# Patient Record
Sex: FEMALE | Race: WHITE | Employment: OTHER | URBAN - NONMETROPOLITAN AREA
[De-identification: names, ages, dates, MRNs, and addresses within clinical notes are randomized per-mention and may not be internally consistent; named-entity substitution may affect disease eponyms.]

---

## 2017-01-06 ENCOUNTER — OFFICE VISIT (OUTPATIENT)
Dept: PRIMARY CARE CLINIC | Age: 32
End: 2017-01-06
Payer: MEDICAID

## 2017-01-06 VITALS
SYSTOLIC BLOOD PRESSURE: 116 MMHG | OXYGEN SATURATION: 98 % | HEART RATE: 108 BPM | HEIGHT: 63 IN | WEIGHT: 221 LBS | DIASTOLIC BLOOD PRESSURE: 79 MMHG | RESPIRATION RATE: 19 BRPM | TEMPERATURE: 97.6 F | BODY MASS INDEX: 39.16 KG/M2

## 2017-01-06 DIAGNOSIS — M54.50 BACK PAIN AT L4-L5 LEVEL: Primary | ICD-10-CM

## 2017-01-06 DIAGNOSIS — N32.81 OVERACTIVE BLADDER: ICD-10-CM

## 2017-01-06 LAB
AMPHETAMINE SCREEN, URINE: ABNORMAL
BARBITURATE SCREEN, URINE: ABNORMAL
BENZODIAZEPINE SCREEN, URINE: ABNORMAL
BILIRUBIN, POC: NORMAL
BLOOD URINE, POC: NORMAL
CLARITY, POC: CLEAR
COCAINE METABOLITE SCREEN URINE: ABNORMAL
COLOR, POC: YELLOW
GLUCOSE URINE, POC: NORMAL
KETONES, POC: NORMAL
LEUKOCYTE EST, POC: NORMAL
MDMA URINE: ABNORMAL
METHADONE SCREEN, URINE: ABNORMAL
METHAMPHETAMINE, URINE: ABNORMAL
NITRITE, POC: NORMAL
OPIATE SCREEN URINE: ABNORMAL
OXYCODONE SCREEN URINE: ABNORMAL
PH, POC: 5.5
PHENCYCLIDINE SCREEN URINE: ABNORMAL
PROPOXYPHENE SCREEN, URINE: ABNORMAL
PROTEIN, POC: NORMAL
SPECIFIC GRAVITY, POC: 1.03
THC: ABNORMAL
TRICYCLIC ANTIDEPRESSANTS, UR: ABNORMAL
UROBILINOGEN, POC: 0.2

## 2017-01-06 PROCEDURE — 81002 URINALYSIS NONAUTO W/O SCOPE: CPT | Performed by: NURSE PRACTITIONER

## 2017-01-06 PROCEDURE — 96372 THER/PROPH/DIAG INJ SC/IM: CPT | Performed by: NURSE PRACTITIONER

## 2017-01-06 PROCEDURE — 99214 OFFICE O/P EST MOD 30 MIN: CPT | Performed by: NURSE PRACTITIONER

## 2017-01-06 PROCEDURE — 80305 DRUG TEST PRSMV DIR OPT OBS: CPT | Performed by: NURSE PRACTITIONER

## 2017-01-06 RX ORDER — BACLOFEN 10 MG/1
10 TABLET ORAL 3 TIMES DAILY
Qty: 90 TABLET | Refills: 5 | Status: SHIPPED | OUTPATIENT
Start: 2017-01-06 | End: 2020-08-17 | Stop reason: ALTCHOICE

## 2017-01-06 RX ORDER — OXYBUTYNIN CHLORIDE 10 MG/1
10 TABLET, EXTENDED RELEASE ORAL DAILY
Qty: 30 TABLET | Refills: 3 | Status: SHIPPED | OUTPATIENT
Start: 2017-01-06

## 2017-01-06 RX ORDER — BETAMETHASONE SODIUM PHOSPHATE AND BETAMETHASONE ACETATE 3; 3 MG/ML; MG/ML
12 INJECTION, SUSPENSION INTRA-ARTICULAR; INTRALESIONAL; INTRAMUSCULAR; SOFT TISSUE ONCE
Status: COMPLETED | OUTPATIENT
Start: 2017-01-06 | End: 2017-01-06

## 2017-01-06 RX ORDER — TRAMADOL HYDROCHLORIDE 50 MG/1
50 TABLET ORAL EVERY 8 HOURS PRN
Qty: 30 TABLET | Refills: 0 | Status: SHIPPED | OUTPATIENT
Start: 2017-01-06 | End: 2017-01-30 | Stop reason: SDUPTHER

## 2017-01-06 RX ADMIN — BETAMETHASONE SODIUM PHOSPHATE AND BETAMETHASONE ACETATE 12 MG: 3; 3 INJECTION, SUSPENSION INTRA-ARTICULAR; INTRALESIONAL; INTRAMUSCULAR; SOFT TISSUE at 14:24

## 2017-01-11 ENCOUNTER — OFFICE VISIT (OUTPATIENT)
Dept: URGENT CARE | Age: 32
End: 2017-01-11
Payer: MEDICAID

## 2017-01-11 VITALS
BODY MASS INDEX: 39.15 KG/M2 | TEMPERATURE: 97.9 F | HEART RATE: 68 BPM | DIASTOLIC BLOOD PRESSURE: 70 MMHG | SYSTOLIC BLOOD PRESSURE: 120 MMHG | WEIGHT: 221 LBS | RESPIRATION RATE: 18 BRPM | OXYGEN SATURATION: 99 %

## 2017-01-11 DIAGNOSIS — J02.9 SORE THROAT: ICD-10-CM

## 2017-01-11 DIAGNOSIS — J02.9 PHARYNGITIS, UNSPECIFIED ETIOLOGY: Primary | ICD-10-CM

## 2017-01-11 LAB — S PYO AG THROAT QL: NORMAL

## 2017-01-11 PROCEDURE — 87880 STREP A ASSAY W/OPTIC: CPT | Performed by: NURSE PRACTITIONER

## 2017-01-11 PROCEDURE — 99213 OFFICE O/P EST LOW 20 MIN: CPT | Performed by: NURSE PRACTITIONER

## 2017-01-11 ASSESSMENT — ENCOUNTER SYMPTOMS
RHINORRHEA: 1
GASTROINTESTINAL NEGATIVE: 1
SINUS PRESSURE: 0
COUGH: 0
SORE THROAT: 1

## 2017-01-12 ENCOUNTER — TELEPHONE (OUTPATIENT)
Dept: URGENT CARE | Age: 32
End: 2017-01-12

## 2017-01-12 DIAGNOSIS — J02.8 PHARYNGITIS DUE TO OTHER ORGANISM: Primary | ICD-10-CM

## 2017-01-12 RX ORDER — AMOXICILLIN AND CLAVULANATE POTASSIUM 875; 125 MG/1; MG/1
1 TABLET, FILM COATED ORAL EVERY 12 HOURS
Qty: 20 TABLET | Refills: 0 | Status: SHIPPED | OUTPATIENT
Start: 2017-01-12 | End: 2017-01-22

## 2017-01-14 PROBLEM — N32.81 OVERACTIVE BLADDER: Status: ACTIVE | Noted: 2017-01-14

## 2017-01-14 PROBLEM — M54.50 BACK PAIN AT L4-L5 LEVEL: Status: ACTIVE | Noted: 2017-01-14

## 2017-01-14 ASSESSMENT — ENCOUNTER SYMPTOMS
BACK PAIN: 1
DIARRHEA: 0
CONSTIPATION: 0
SHORTNESS OF BREATH: 0

## 2017-01-19 ENCOUNTER — OFFICE VISIT (OUTPATIENT)
Dept: PRIMARY CARE CLINIC | Age: 32
End: 2017-01-19
Payer: MEDICAID

## 2017-01-19 VITALS
SYSTOLIC BLOOD PRESSURE: 120 MMHG | RESPIRATION RATE: 16 BRPM | DIASTOLIC BLOOD PRESSURE: 60 MMHG | HEART RATE: 98 BPM | TEMPERATURE: 98.4 F | BODY MASS INDEX: 38.09 KG/M2 | OXYGEN SATURATION: 99 % | WEIGHT: 215 LBS | HEIGHT: 63 IN

## 2017-01-19 DIAGNOSIS — J01.00 ACUTE NON-RECURRENT MAXILLARY SINUSITIS: Primary | ICD-10-CM

## 2017-01-19 PROCEDURE — 99213 OFFICE O/P EST LOW 20 MIN: CPT | Performed by: FAMILY MEDICINE

## 2017-01-19 RX ORDER — DOXYCYCLINE HYCLATE 100 MG
100 TABLET ORAL 2 TIMES DAILY
Qty: 20 TABLET | Refills: 0 | Status: SHIPPED | OUTPATIENT
Start: 2017-01-19 | End: 2017-01-29

## 2017-01-19 RX ORDER — AZELASTINE 1 MG/ML
2 SPRAY, METERED NASAL 2 TIMES DAILY
Qty: 1 BOTTLE | Refills: 3 | Status: SHIPPED | OUTPATIENT
Start: 2017-01-19 | End: 2020-08-17 | Stop reason: ALTCHOICE

## 2017-01-19 RX ORDER — DEXTROMETHORPHAN HYDROBROMIDE AND PROMETHAZINE HYDROCHLORIDE 15; 6.25 MG/5ML; MG/5ML
5 SYRUP ORAL 4 TIMES DAILY PRN
Qty: 180 ML | Refills: 0 | Status: SHIPPED | OUTPATIENT
Start: 2017-01-19 | End: 2017-01-26

## 2017-01-19 ASSESSMENT — ENCOUNTER SYMPTOMS
COUGH: 1
SHORTNESS OF BREATH: 0
RHINORRHEA: 1
SORE THROAT: 1
SINUS PRESSURE: 1

## 2017-01-27 RX ORDER — TRAMADOL HYDROCHLORIDE 50 MG/1
TABLET ORAL
Qty: 30 TABLET | Refills: 0 | OUTPATIENT
Start: 2017-01-27

## 2017-01-30 DIAGNOSIS — M54.50 BACK PAIN AT L4-L5 LEVEL: ICD-10-CM

## 2017-01-30 RX ORDER — TRAMADOL HYDROCHLORIDE 50 MG/1
50 TABLET ORAL EVERY 8 HOURS PRN
Qty: 30 TABLET | Refills: 0 | OUTPATIENT
Start: 2017-01-30 | End: 2017-02-09

## 2017-07-14 ENCOUNTER — OFFICE VISIT (OUTPATIENT)
Dept: URGENT CARE | Age: 32
End: 2017-07-14
Payer: MEDICAID

## 2017-07-14 VITALS
WEIGHT: 222 LBS | HEART RATE: 110 BPM | TEMPERATURE: 98.2 F | OXYGEN SATURATION: 98 % | HEIGHT: 64 IN | RESPIRATION RATE: 18 BRPM | BODY MASS INDEX: 37.9 KG/M2 | SYSTOLIC BLOOD PRESSURE: 130 MMHG | DIASTOLIC BLOOD PRESSURE: 79 MMHG

## 2017-07-14 DIAGNOSIS — M51.36 DEGENERATIVE DISC DISEASE, LUMBAR: Primary | ICD-10-CM

## 2017-07-14 DIAGNOSIS — G89.29 CHRONIC RIGHT-SIDED LOW BACK PAIN WITH LEFT-SIDED SCIATICA: ICD-10-CM

## 2017-07-14 DIAGNOSIS — M54.42 CHRONIC RIGHT-SIDED LOW BACK PAIN WITH LEFT-SIDED SCIATICA: ICD-10-CM

## 2017-07-14 PROCEDURE — 99213 OFFICE O/P EST LOW 20 MIN: CPT | Performed by: NURSE PRACTITIONER

## 2017-07-14 RX ORDER — IBUPROFEN 200 MG
200 TABLET ORAL EVERY 6 HOURS PRN
COMMUNITY
End: 2017-08-15

## 2017-07-14 RX ORDER — IBUPROFEN 800 MG/1
TABLET ORAL
Qty: 120 TABLET | Refills: 0 | Status: SHIPPED | OUTPATIENT
Start: 2017-07-14 | End: 2020-08-17 | Stop reason: ALTCHOICE

## 2017-07-14 ASSESSMENT — ENCOUNTER SYMPTOMS
RESPIRATORY NEGATIVE: 1
BACK PAIN: 1

## 2020-03-31 ENCOUNTER — TELEPHONE (OUTPATIENT)
Dept: OBSTETRICS AND GYNECOLOGY | Facility: CLINIC | Age: 35
End: 2020-03-31

## 2020-04-08 ENCOUNTER — HOSPITAL ENCOUNTER (EMERGENCY)
Facility: HOSPITAL | Age: 35
Discharge: HOME | End: 2020-04-08
Attending: INTERNAL MEDICINE | Admitting: INTERNAL MEDICINE

## 2020-04-08 VITALS
WEIGHT: 210 LBS | BODY MASS INDEX: 37.21 KG/M2 | HEART RATE: 82 BPM | DIASTOLIC BLOOD PRESSURE: 91 MMHG | OXYGEN SATURATION: 100 % | HEIGHT: 63 IN | TEMPERATURE: 98.8 F | RESPIRATION RATE: 16 BRPM | SYSTOLIC BLOOD PRESSURE: 128 MMHG

## 2020-04-08 DIAGNOSIS — J02.8 PHARYNGITIS DUE TO OTHER ORGANISM: Primary | ICD-10-CM

## 2020-04-08 LAB — S PYO AG THROAT QL: NEGATIVE

## 2020-04-08 PROCEDURE — 87880 STREP A ASSAY W/OPTIC: CPT | Performed by: INTERNAL MEDICINE

## 2020-04-08 PROCEDURE — 99283 EMERGENCY DEPT VISIT LOW MDM: CPT

## 2020-04-08 PROCEDURE — 87081 CULTURE SCREEN ONLY: CPT | Performed by: INTERNAL MEDICINE

## 2020-04-08 RX ORDER — AMOXICILLIN 250 MG/1
250 CAPSULE ORAL 3 TIMES DAILY
Qty: 21 CAPSULE | Refills: 0 | Status: SHIPPED | OUTPATIENT
Start: 2020-04-08 | End: 2020-06-04

## 2020-04-10 LAB — BACTERIA SPEC AEROBE CULT: NORMAL

## 2020-06-04 ENCOUNTER — OFFICE VISIT (OUTPATIENT)
Dept: OBSTETRICS AND GYNECOLOGY | Facility: CLINIC | Age: 35
End: 2020-06-04

## 2020-06-04 VITALS
HEIGHT: 63 IN | WEIGHT: 210 LBS | BODY MASS INDEX: 37.21 KG/M2 | DIASTOLIC BLOOD PRESSURE: 76 MMHG | SYSTOLIC BLOOD PRESSURE: 118 MMHG

## 2020-06-04 DIAGNOSIS — Z76.89 ESTABLISHING CARE WITH NEW DOCTOR, ENCOUNTER FOR: ICD-10-CM

## 2020-06-04 DIAGNOSIS — R10.2 PELVIC PAIN: Primary | ICD-10-CM

## 2020-06-04 PROCEDURE — 99203 OFFICE O/P NEW LOW 30 MIN: CPT | Performed by: NURSE PRACTITIONER

## 2020-06-22 ENCOUNTER — OFFICE VISIT (OUTPATIENT)
Dept: INTERNAL MEDICINE | Facility: CLINIC | Age: 35
End: 2020-06-22

## 2020-06-22 ENCOUNTER — LAB (OUTPATIENT)
Dept: LAB | Facility: HOSPITAL | Age: 35
End: 2020-06-22

## 2020-06-22 VITALS
BODY MASS INDEX: 37.12 KG/M2 | TEMPERATURE: 98.5 F | OXYGEN SATURATION: 98 % | SYSTOLIC BLOOD PRESSURE: 110 MMHG | HEART RATE: 110 BPM | DIASTOLIC BLOOD PRESSURE: 72 MMHG | HEIGHT: 63 IN | RESPIRATION RATE: 16 BRPM | WEIGHT: 209.5 LBS

## 2020-06-22 DIAGNOSIS — F41.9 ANXIETY AND DEPRESSION: ICD-10-CM

## 2020-06-22 DIAGNOSIS — N39.3 STRESS INCONTINENCE IN FEMALE: ICD-10-CM

## 2020-06-22 DIAGNOSIS — R25.2 MUSCLE CRAMPS: Primary | ICD-10-CM

## 2020-06-22 DIAGNOSIS — F17.210 CIGARETTE SMOKER: ICD-10-CM

## 2020-06-22 DIAGNOSIS — E66.09 CLASS 2 OBESITY DUE TO EXCESS CALORIES WITHOUT SERIOUS COMORBIDITY WITH BODY MASS INDEX (BMI) OF 37.0 TO 37.9 IN ADULT: ICD-10-CM

## 2020-06-22 DIAGNOSIS — Z00.00 ENCOUNTER FOR PREVENTIVE HEALTH EXAMINATION: ICD-10-CM

## 2020-06-22 DIAGNOSIS — F32.A ANXIETY AND DEPRESSION: ICD-10-CM

## 2020-06-22 DIAGNOSIS — Z80.41 FAMILY HISTORY OF OVARIAN CANCER: ICD-10-CM

## 2020-06-22 DIAGNOSIS — R25.2 MUSCLE CRAMPS: ICD-10-CM

## 2020-06-22 PROBLEM — E66.812 CLASS 2 OBESITY DUE TO EXCESS CALORIES WITHOUT SERIOUS COMORBIDITY WITH BODY MASS INDEX (BMI) OF 37.0 TO 37.9 IN ADULT: Status: ACTIVE | Noted: 2020-06-22

## 2020-06-22 LAB
ALBUMIN SERPL-MCNC: 4.4 G/DL (ref 3.5–5.2)
ALBUMIN/GLOB SERPL: 1.4 G/DL
ALP SERPL-CCNC: 59 U/L (ref 39–117)
ALT SERPL W P-5'-P-CCNC: 23 U/L (ref 1–33)
ANION GAP SERPL CALCULATED.3IONS-SCNC: 13 MMOL/L (ref 5–15)
AST SERPL-CCNC: 15 U/L (ref 1–32)
BILIRUB SERPL-MCNC: 0.4 MG/DL (ref 0.2–1.2)
BUN BLD-MCNC: 9 MG/DL (ref 6–20)
BUN/CREAT SERPL: 12.9 (ref 7–25)
CALCIUM SPEC-SCNC: 9.3 MG/DL (ref 8.6–10.5)
CHLORIDE SERPL-SCNC: 100 MMOL/L (ref 98–107)
CO2 SERPL-SCNC: 26 MMOL/L (ref 22–29)
CREAT BLD-MCNC: 0.7 MG/DL (ref 0.57–1)
DEPRECATED RDW RBC AUTO: 44 FL (ref 37–54)
ERYTHROCYTE [DISTWIDTH] IN BLOOD BY AUTOMATED COUNT: 12.9 % (ref 12.3–15.4)
GFR SERPL CREATININE-BSD FRML MDRD: 96 ML/MIN/1.73
GLOBULIN UR ELPH-MCNC: 3.2 GM/DL
GLUCOSE BLD-MCNC: 88 MG/DL (ref 65–99)
HCT VFR BLD AUTO: 43.4 % (ref 34–46.6)
HGB BLD-MCNC: 14.7 G/DL (ref 12–15.9)
MCH RBC QN AUTO: 31.6 PG (ref 26.6–33)
MCHC RBC AUTO-ENTMCNC: 33.9 G/DL (ref 31.5–35.7)
MCV RBC AUTO: 93.3 FL (ref 79–97)
PLATELET # BLD AUTO: 315 10*3/MM3 (ref 140–450)
PMV BLD AUTO: 11 FL (ref 6–12)
POTASSIUM BLD-SCNC: 4.3 MMOL/L (ref 3.5–5.2)
PROT SERPL-MCNC: 7.6 G/DL (ref 6–8.5)
RBC # BLD AUTO: 4.65 10*6/MM3 (ref 3.77–5.28)
SODIUM BLD-SCNC: 139 MMOL/L (ref 136–145)
WBC NRBC COR # BLD: 11.31 10*3/MM3 (ref 3.4–10.8)

## 2020-06-22 PROCEDURE — 36415 COLL VENOUS BLD VENIPUNCTURE: CPT

## 2020-06-22 PROCEDURE — 82550 ASSAY OF CK (CPK): CPT | Performed by: INTERNAL MEDICINE

## 2020-06-22 PROCEDURE — 80061 LIPID PANEL: CPT | Performed by: INTERNAL MEDICINE

## 2020-06-22 PROCEDURE — 80053 COMPREHEN METABOLIC PANEL: CPT | Performed by: INTERNAL MEDICINE

## 2020-06-22 PROCEDURE — 99204 OFFICE O/P NEW MOD 45 MIN: CPT | Performed by: INTERNAL MEDICINE

## 2020-06-22 PROCEDURE — 86803 HEPATITIS C AB TEST: CPT | Performed by: INTERNAL MEDICINE

## 2020-06-22 PROCEDURE — 83036 HEMOGLOBIN GLYCOSYLATED A1C: CPT | Performed by: INTERNAL MEDICINE

## 2020-06-22 PROCEDURE — 85027 COMPLETE CBC AUTOMATED: CPT | Performed by: INTERNAL MEDICINE

## 2020-06-23 LAB
CHOLEST SERPL-MCNC: 182 MG/DL (ref 0–200)
CK SERPL-CCNC: 58 U/L (ref 20–180)
HBA1C MFR BLD: 5.5 % (ref 4.8–5.6)
HCV AB SER DONR QL: NORMAL
HDLC SERPL-MCNC: 36 MG/DL (ref 40–60)
LDLC SERPL CALC-MCNC: 128 MG/DL (ref 0–100)
LDLC/HDLC SERPL: 3.55 {RATIO}
TRIGL SERPL-MCNC: 91 MG/DL (ref 0–150)
VLDLC SERPL-MCNC: 18.2 MG/DL (ref 5–40)

## 2020-07-06 ENCOUNTER — OFFICE VISIT (OUTPATIENT)
Dept: OBSTETRICS AND GYNECOLOGY | Facility: CLINIC | Age: 35
End: 2020-07-06

## 2020-07-06 VITALS
BODY MASS INDEX: 36.5 KG/M2 | DIASTOLIC BLOOD PRESSURE: 70 MMHG | HEIGHT: 63 IN | SYSTOLIC BLOOD PRESSURE: 118 MMHG | WEIGHT: 206 LBS

## 2020-07-06 DIAGNOSIS — R10.2 PELVIC PAIN: Primary | ICD-10-CM

## 2020-07-06 DIAGNOSIS — N39.46 MIXED INCONTINENCE: ICD-10-CM

## 2020-07-06 PROCEDURE — 99213 OFFICE O/P EST LOW 20 MIN: CPT | Performed by: NURSE PRACTITIONER

## 2020-07-15 ENCOUNTER — TELEPHONE (OUTPATIENT)
Dept: OBSTETRICS AND GYNECOLOGY | Facility: CLINIC | Age: 35
End: 2020-07-15

## 2020-07-17 ENCOUNTER — PATIENT MESSAGE (OUTPATIENT)
Dept: OBSTETRICS AND GYNECOLOGY | Facility: CLINIC | Age: 35
End: 2020-07-17

## 2020-07-22 ENCOUNTER — TELEPHONE (OUTPATIENT)
Dept: OBSTETRICS AND GYNECOLOGY | Facility: CLINIC | Age: 35
End: 2020-07-22

## 2020-07-22 DIAGNOSIS — N39.46 MIXED INCONTINENCE: Primary | ICD-10-CM

## 2020-07-23 ENCOUNTER — TELEPHONE (OUTPATIENT)
Dept: OBSTETRICS AND GYNECOLOGY | Facility: CLINIC | Age: 35
End: 2020-07-23

## 2020-07-29 RX ORDER — OXYBUTYNIN CHLORIDE 10 MG/1
10 TABLET, EXTENDED RELEASE ORAL DAILY
Qty: 30 TABLET | Refills: 12 | Status: SHIPPED | OUTPATIENT
Start: 2020-07-29 | End: 2021-08-20

## 2020-08-17 ENCOUNTER — HOSPITAL ENCOUNTER (INPATIENT)
Age: 35
LOS: 2 days | Discharge: HOME | DRG: 885 | End: 2020-08-19
Attending: PEDIATRICS | Admitting: PSYCHIATRY & NEUROLOGY
Payer: MEDICAID

## 2020-08-17 PROBLEM — F25.0 SCHIZOAFFECTIVE DISORDER, BIPOLAR TYPE (HCC): Status: ACTIVE | Noted: 2020-08-17

## 2020-08-17 LAB
ACETAMINOPHEN LEVEL: <15 UG/ML
ALBUMIN SERPL-MCNC: 4.7 G/DL (ref 3.5–5.2)
ALP BLD-CCNC: 65 U/L (ref 35–104)
ALT SERPL-CCNC: 31 U/L (ref 5–33)
AMPHETAMINE SCREEN, URINE: NEGATIVE
ANION GAP SERPL CALCULATED.3IONS-SCNC: 19 MMOL/L (ref 7–19)
AST SERPL-CCNC: 25 U/L (ref 5–32)
BACTERIA: NEGATIVE /HPF
BARBITURATE SCREEN URINE: NEGATIVE
BASOPHILS ABSOLUTE: 0.1 K/UL (ref 0–0.2)
BASOPHILS RELATIVE PERCENT: 0.5 % (ref 0–1)
BENZODIAZEPINE SCREEN, URINE: NEGATIVE
BILIRUB SERPL-MCNC: 0.7 MG/DL (ref 0.2–1.2)
BILIRUBIN URINE: NEGATIVE
BLOOD, URINE: ABNORMAL
BUN BLDV-MCNC: 9 MG/DL (ref 6–20)
CALCIUM SERPL-MCNC: 9.4 MG/DL (ref 8.6–10)
CANNABINOID SCREEN URINE: POSITIVE
CHLORIDE BLD-SCNC: 102 MMOL/L (ref 98–111)
CLARITY: ABNORMAL
CO2: 17 MMOL/L (ref 22–29)
COCAINE METABOLITE SCREEN URINE: NEGATIVE
COLOR: YELLOW
CREAT SERPL-MCNC: 0.8 MG/DL (ref 0.5–0.9)
CRYSTALS, UA: ABNORMAL /HPF
EOSINOPHILS ABSOLUTE: 0 K/UL (ref 0–0.6)
EOSINOPHILS RELATIVE PERCENT: 0.1 % (ref 0–5)
EPITHELIAL CELLS, UA: 6 /HPF (ref 0–5)
ETHANOL: <10 MG/DL (ref 0–0.08)
GFR AFRICAN AMERICAN: >59
GFR NON-AFRICAN AMERICAN: >60
GLUCOSE BLD-MCNC: 146 MG/DL (ref 74–109)
GLUCOSE URINE: NEGATIVE MG/DL
HCG QUALITATIVE: NEGATIVE
HCT VFR BLD CALC: 46.1 % (ref 37–47)
HEMOGLOBIN: 15.7 G/DL (ref 12–16)
HYALINE CASTS: 6 /HPF (ref 0–8)
IMMATURE GRANULOCYTES #: 0.1 K/UL
KETONES, URINE: 15 MG/DL
LEUKOCYTE ESTERASE, URINE: NEGATIVE
LYMPHOCYTES ABSOLUTE: 2.5 K/UL (ref 1.1–4.5)
LYMPHOCYTES RELATIVE PERCENT: 14.2 % (ref 20–40)
Lab: ABNORMAL
MCH RBC QN AUTO: 31.9 PG (ref 27–31)
MCHC RBC AUTO-ENTMCNC: 34.1 G/DL (ref 33–37)
MCV RBC AUTO: 93.7 FL (ref 81–99)
MONOCYTES ABSOLUTE: 1.5 K/UL (ref 0–0.9)
MONOCYTES RELATIVE PERCENT: 8.2 % (ref 0–10)
NEUTROPHILS ABSOLUTE: 13.6 K/UL (ref 1.5–7.5)
NEUTROPHILS RELATIVE PERCENT: 76.7 % (ref 50–65)
NITRITE, URINE: NEGATIVE
OPIATE SCREEN URINE: NEGATIVE
PDW BLD-RTO: 12.9 % (ref 11.5–14.5)
PH UA: 5.5 (ref 5–8)
PLATELET # BLD: 342 K/UL (ref 130–400)
PMV BLD AUTO: 11.3 FL (ref 9.4–12.3)
POTASSIUM SERPL-SCNC: 3.6 MMOL/L (ref 3.5–5)
PROTEIN UA: ABNORMAL MG/DL
RBC # BLD: 4.92 M/UL (ref 4.2–5.4)
RBC UA: 3 /HPF (ref 0–4)
SALICYLATE, SERUM: <3 MG/DL (ref 3–10)
SODIUM BLD-SCNC: 138 MMOL/L (ref 136–145)
SPECIFIC GRAVITY UA: 1.01 (ref 1–1.03)
TOTAL PROTEIN: 8.2 G/DL (ref 6.6–8.7)
UROBILINOGEN, URINE: 0.2 E.U./DL
WBC # BLD: 17.8 K/UL (ref 4.8–10.8)
WBC UA: 5 /HPF (ref 0–5)

## 2020-08-17 PROCEDURE — 1240000000 HC EMOTIONAL WELLNESS R&B

## 2020-08-17 PROCEDURE — 6360000002 HC RX W HCPCS: Performed by: PSYCHIATRY & NEUROLOGY

## 2020-08-17 PROCEDURE — 6370000000 HC RX 637 (ALT 250 FOR IP): Performed by: PSYCHIATRY & NEUROLOGY

## 2020-08-17 PROCEDURE — 84703 CHORIONIC GONADOTROPIN ASSAY: CPT

## 2020-08-17 PROCEDURE — 80307 DRUG TEST PRSMV CHEM ANLYZR: CPT

## 2020-08-17 PROCEDURE — G0480 DRUG TEST DEF 1-7 CLASSES: HCPCS

## 2020-08-17 PROCEDURE — 85025 COMPLETE CBC W/AUTO DIFF WBC: CPT

## 2020-08-17 PROCEDURE — 36415 COLL VENOUS BLD VENIPUNCTURE: CPT

## 2020-08-17 PROCEDURE — 99284 EMERGENCY DEPT VISIT MOD MDM: CPT

## 2020-08-17 PROCEDURE — 80053 COMPREHEN METABOLIC PANEL: CPT

## 2020-08-17 PROCEDURE — 81001 URINALYSIS AUTO W/SCOPE: CPT

## 2020-08-17 PROCEDURE — 96372 THER/PROPH/DIAG INJ SC/IM: CPT

## 2020-08-17 PROCEDURE — 6360000002 HC RX W HCPCS: Performed by: PEDIATRICS

## 2020-08-17 PROCEDURE — 99223 1ST HOSP IP/OBS HIGH 75: CPT | Performed by: PSYCHIATRY & NEUROLOGY

## 2020-08-17 RX ORDER — CHLORPROMAZINE HYDROCHLORIDE 25 MG/ML
50 INJECTION INTRAMUSCULAR ONCE
Status: COMPLETED | OUTPATIENT
Start: 2020-08-17 | End: 2020-08-17

## 2020-08-17 RX ORDER — HALOPERIDOL 5 MG/ML
5 INJECTION INTRAMUSCULAR EVERY 6 HOURS PRN
Status: DISCONTINUED | OUTPATIENT
Start: 2020-08-17 | End: 2020-08-17

## 2020-08-17 RX ORDER — RISPERIDONE 1 MG/1
1 TABLET, FILM COATED ORAL 2 TIMES DAILY
Status: DISCONTINUED | OUTPATIENT
Start: 2020-08-17 | End: 2020-08-19 | Stop reason: HOSPADM

## 2020-08-17 RX ORDER — HALOPERIDOL 5 MG/ML
5 INJECTION INTRAMUSCULAR ONCE
Status: COMPLETED | OUTPATIENT
Start: 2020-08-17 | End: 2020-08-17

## 2020-08-17 RX ORDER — OXYBUTYNIN CHLORIDE 5 MG/1
10 TABLET, EXTENDED RELEASE ORAL DAILY
Status: DISCONTINUED | OUTPATIENT
Start: 2020-08-17 | End: 2020-08-19 | Stop reason: HOSPADM

## 2020-08-17 RX ORDER — LORAZEPAM 2 MG/ML
2 INJECTION INTRAMUSCULAR EVERY 6 HOURS PRN
Status: DISCONTINUED | OUTPATIENT
Start: 2020-08-17 | End: 2020-08-19 | Stop reason: HOSPADM

## 2020-08-17 RX ORDER — TRAZODONE HYDROCHLORIDE 150 MG/1
150 TABLET ORAL PRN
COMMUNITY

## 2020-08-17 RX ORDER — NICOTINE 21 MG/24HR
1 PATCH, TRANSDERMAL 24 HOURS TRANSDERMAL DAILY
Status: DISCONTINUED | OUTPATIENT
Start: 2020-08-17 | End: 2020-08-19 | Stop reason: HOSPADM

## 2020-08-17 RX ORDER — LORAZEPAM 2 MG/ML
2 INJECTION INTRAMUSCULAR ONCE
Status: COMPLETED | OUTPATIENT
Start: 2020-08-17 | End: 2020-08-17

## 2020-08-17 RX ORDER — DIVALPROEX SODIUM 500 MG/1
1000 TABLET, EXTENDED RELEASE ORAL DAILY
Status: DISCONTINUED | OUTPATIENT
Start: 2020-08-17 | End: 2020-08-19 | Stop reason: HOSPADM

## 2020-08-17 RX ORDER — POLYETHYLENE GLYCOL 3350 17 G/17G
17 POWDER, FOR SOLUTION ORAL DAILY PRN
Status: DISCONTINUED | OUTPATIENT
Start: 2020-08-17 | End: 2020-08-19 | Stop reason: HOSPADM

## 2020-08-17 RX ORDER — HALOPERIDOL 5 MG/ML
10 INJECTION INTRAMUSCULAR EVERY 6 HOURS PRN
Status: DISCONTINUED | OUTPATIENT
Start: 2020-08-17 | End: 2020-08-19 | Stop reason: HOSPADM

## 2020-08-17 RX ORDER — LORAZEPAM 2 MG/ML
2 INJECTION INTRAMUSCULAR EVERY 6 HOURS PRN
Status: DISCONTINUED | OUTPATIENT
Start: 2020-08-17 | End: 2020-08-17

## 2020-08-17 RX ORDER — ACETAMINOPHEN 325 MG/1
650 TABLET ORAL EVERY 4 HOURS PRN
Status: DISCONTINUED | OUTPATIENT
Start: 2020-08-17 | End: 2020-08-19 | Stop reason: HOSPADM

## 2020-08-17 RX ADMIN — LORAZEPAM 2 MG: 2 INJECTION INTRAMUSCULAR; INTRAVENOUS at 17:34

## 2020-08-17 RX ADMIN — LORAZEPAM 2 MG: 2 INJECTION INTRAMUSCULAR; INTRAVENOUS at 03:04

## 2020-08-17 RX ADMIN — OXYBUTYNIN CHLORIDE 10 MG: 5 TABLET, EXTENDED RELEASE ORAL at 12:10

## 2020-08-17 RX ADMIN — DIVALPROEX SODIUM 1000 MG: 500 TABLET, EXTENDED RELEASE ORAL at 12:10

## 2020-08-17 RX ADMIN — HALOPERIDOL LACTATE 5 MG: 5 INJECTION INTRAMUSCULAR at 17:34

## 2020-08-17 RX ADMIN — ACETAMINOPHEN 650 MG: 325 TABLET ORAL at 04:29

## 2020-08-17 RX ADMIN — RISPERIDONE 1 MG: 1 TABLET ORAL at 12:10

## 2020-08-17 RX ADMIN — CHLORPROMAZINE HYDROCHLORIDE 50 MG: 25 INJECTION INTRAMUSCULAR at 04:01

## 2020-08-17 RX ADMIN — HALOPERIDOL LACTATE 5 MG: 5 INJECTION INTRAMUSCULAR at 03:08

## 2020-08-17 NOTE — PROGRESS NOTES
I Admission from ED  Nursing Admission Note             Patient Active Problem List   Diagnosis    Back pain at L4-L5 level    Overactive bladder         Addictive Behavior:   Addictive Behavior  In the past 3 months, have you felt or has someone told you that you have a problem with:  : (Unable to assess this section)    Medical Problems:   Past Medical History:   Diagnosis Date    Depression     H/O borderline personality disorder     History of cervical cancer     Sciatica        Status EXAM:  Status and Exam  Normal: No  Facial Expression: Exaggerated, Elevated  Affect: Unstable  Level of Consciousness: Alert  Mood:Normal: No  Mood: Anxious  Motor Activity:Normal: No  Motor Activity: Tics, Repetitive Acts  Interview Behavior: Impulsive, Uncooperative/Withdrawn  Preception: [place] to Person  Attention:Normal: No  Attention: Distractible, Unable to Concentrate, Hyperalert  Thought Processes: Blocking, Flt.of Ideas  Thought Content:Normal: No  Thought Content: Preoccupations  Hallucinations: Unable to assess  Delusions: No  Memory:Normal: Yes  Insight and Judgment: No  Insight and Judgment: Poor Judgment, Poor Insight  Present Suicidal Ideation: No  Present Homicidal Ideation: No      Metabolic Screening:    No results found for: LABA1C  Lab Results   Component Value Date    CHOL 166 11/11/2014     Lab Results   Component Value Date    TRIG 94 11/11/2014     Lab Results   Component Value Date    HDL 37 11/11/2014     No components found for: LDLCAL  No results found for: LABVLDL    Body mass index is 30.85 kg/m².   BP Readings from Last 2 Encounters:   08/17/20 (!) 134/97   08/15/17 116/78       PATIENT STRENGTHS:  Strengths: No significant Physical Illness    Patient Strengths and Limitations:  Limitations: Unrealistic self-view, General negative or hopeless attitude about future/recovery, Difficulty problem solving/relies on others to help solve problems, Multiple barriers to leisure interests, Demonstrates discomfort with /lack of social skills, Difficult relationships / poor social skills      Tobacco Screening:  Practical Counseling, on admission, mona X, if applicable and completed (first 3 are required if patient doesn't refuse):            ( )  Recognizing danger situations (included triggers and roadblocks)                    ( )  Coping skills (new ways to manage stress, exercise, relaxation techniques, changing routine, distraction)                                                           ( )  Basic information about quitting (benefits of quitting, techniques in how to quit, available resources  ( ) Referral for counseling faxed to [facility]                                           ( ) Patient refused counseling  ( ) Patient has not smoked in the last 30 days        Admission to Unit:    Pt admitted to [facility] under the care of Dr. Ronni España,  arrived on unit via [facility] with security and staff    Patient arrived dressed in paper scrubs:  yes. Body assessment and safety check completed by staff and  no contraband discovered. Patient belongings and valuables was cataloged and accounted for by staff. Admission completed  Oriented to unit, unit policy and expectations:  yes    Reviewed and explained all legal documents:  yes    Education for Fall Prevention and Restraints given: yes    Patient signed all legal documents yes   Pt verbalizes understanding:yes     Srikanth President Obtained? Yes, ready for review    Identifies stressors. Unable to assess      Admission Note:    Patient admitted to [facility] with increased manic behavior. After arrival on unit, patient kept repeatedly stating to nurse, \"Follow my lead, I need you to process what I am thinking, follow my lead. I am a schizomaniac, you know that. \"  Patient kept talking to nurses, wanting nursing to stay in the room and talk to her. Patient does have notable tics.   Patient continued to repeat to nursing that she needed us to follow her lead, that we needed to keep talking to her. Patient was shown to her room. Unable to assess patient, poor historian.       Electronically signed by Jaime Mistry LPN on 9/83/21 at 9:34 AM CDT

## 2020-08-17 NOTE — PROGRESS NOTES
Admission Note      Reason for admission/Target Symptom: Patient admitted to [facility] due to female who presents to the emergency department with manic behavior. Mother tells staff that patient's boyfriend just broke up with her. Patient is unable to give history or follow most simple commands at this time. Patient appears very anxious and just keeps repeating phrases over and over such as \"focus. \"  And \"repeat after me. \"  Patient is unable to tell me why she is here today  Diagnoses: Depression NOS  UDS: Cannabinoid  BAL:  Neg    SW met with treatment team to discuss patient's treatment including care planning, discharge planning, and follow-up needs. Pt has been admitted to [facility]. Treatment team has identified patient's discharge needs as medication management and outpatient therapy/counseling. Pt confirmed  the need for ongoing treatment post inpatient stay. Pt was also provided a handout of contact information for drug and alcohol treatment centers and other community support service such as [facility]EL[facility], AA, and [facility].

## 2020-08-17 NOTE — PLAN OF CARE
Group Therapy Note     Date: 8/17/2020  Start Time: 5017  End Time:  1600  Number of Participants: 6     Type of Group: Recovery     Wellness Binder Information  Module Name:  relapse prevention  Session Number:  2     Patient's Goal:  identifying early warning signs     Notes:  pt was verbally prompted to attend group. Pt refused. Information about relapse prevention was provided. Status After Intervention:       Participation Level:      Participation Quality:         Speech:           Thought Process/Content:         Affective Functioning:         Mood:         Level of consciousness:          Response to Learning:         Endings:      Modes of Intervention:         Discipline Responsible: Psychoeducational Specialist        Signature:  Zoey Diez

## 2020-08-17 NOTE — PLAN OF CARE
Problem: Altered Mood, Manic Behavior:  Goal: Able to sleep  Description: Able to sleep  Outcome: Ongoing  Goal: Able to verbalize decrease in frequency and intensity of racing thoughts  Description: Able to verbalize decrease in frequency and intensity of racing thoughts  Outcome: Ongoing  Goal: Ability to disclose and discuss suicidal ideas will improve  Description: Ability to disclose and discuss suicidal ideas will improve  Outcome: Ongoing  Goal: Absence of self-harm  Description: Absence of self-harm  Outcome: Ongoing  Goal: Ability to achieve adequate nutritional intake will improve  Description: Ability to achieve adequate nutritional intake will improve  Outcome: Ongoing  Goal: Ability to interact with others will improve  Description: Ability to interact with others will improve  Outcome: Ongoing  Goal: Ability to demonstrate self-control will improve  Description: Ability to demonstrate self-control will improve  Outcome: Ongoing  Goal: Mood stable  Description: Mood stable  Outcome: Ongoing  Goal: Patient specific goal  Description: Patient specific goal  Outcome: Ongoing     Problem: Altered Mood, Psychotic Behavior:  Goal: Able to demonstrate trust by eating, participating in treatment and following staff's direction  Description: Able to demonstrate trust by eating, participating in treatment and following staff's direction  Outcome: Ongoing  Goal: Able to verbalize decrease in frequency and intensity of hallucinations  Description: Able to verbalize decrease in frequency and intensity of hallucinations  Outcome: Ongoing  Goal: Able to verbalize reality based thinking  Description: Able to verbalize reality based thinking  Outcome: Ongoing  Goal: Absence of self-harm  Description: Absence of self-harm  Outcome: Ongoing  Goal: Ability to achieve adequate nutritional intake will improve  Description: Ability to achieve adequate nutritional intake will improve  Outcome: Ongoing  Goal: Ability to interact with others will improve  Description: Ability to interact with others will improve  Outcome: Ongoing  Goal: Compliance with prescribed medication regimen will improve  Description: Compliance with prescribed medication regimen will improve  Outcome: Ongoing  Goal: Patient specific goal  Description: Patient specific goal  Outcome: Ongoing     Problem: Anxiety:  Goal: Level of anxiety will decrease  Description: Level of anxiety will decrease  Outcome: Ongoing     Problem: Altered Mood, Manic Behavior:  Goal: Able to sleep  Description: Able to sleep  8/17/2020 1315 by Bry Salas RN  Outcome: Ongoing  8/17/2020 1315 by Bry Salas RN  Outcome: Ongoing  Goal: Able to verbalize decrease in frequency and intensity of racing thoughts  Description: Able to verbalize decrease in frequency and intensity of racing thoughts  8/17/2020 1315 by Bry Salas RN  Outcome: Ongoing  8/17/2020 1315 by Bry Salas RN  Outcome: Ongoing  Goal: Ability to disclose and discuss suicidal ideas will improve  Description: Ability to disclose and discuss suicidal ideas will improve  8/17/2020 1315 by Bry Salas RN  Outcome: Ongoing  8/17/2020 1315 by Bry Salas RN  Outcome: Ongoing  Goal: Absence of self-harm  Description: Absence of self-harm  8/17/2020 1315 by Bry Salas RN  Outcome: Ongoing  8/17/2020 1315 by Bry Salas RN  Outcome: Ongoing  Goal: Ability to achieve adequate nutritional intake will improve  Description: Ability to achieve adequate nutritional intake will improve  8/17/2020 1315 by Bry Salas RN  Outcome: Ongoing  8/17/2020 1315 by Bry Salas RN  Outcome: Ongoing  Goal: Ability to interact with others will improve  Description: Ability to interact with others will improve  8/17/2020 1315 by rBy Salas RN  Outcome: Ongoing  8/17/2020 1315 by Bry Salas RN  Outcome: Ongoing  Goal: Ability to demonstrate self-control will improve  Description: Ability to demonstrate self-control will improve  8/17/2020 1315 by Sera Man RN  Outcome: Ongoing  8/17/2020 1315 by Sera Man RN  Outcome: Ongoing  Goal: Mood stable  Description: Mood stable  8/17/2020 1315 by Sera Man RN  Outcome: Ongoing  8/17/2020 1315 by Sera Man RN  Outcome: Ongoing  Goal: Patient specific goal  Description: Patient specific goal  8/17/2020 1315 by Sera Man RN  Outcome: Ongoing  8/17/2020 1315 by Sera Man RN  Outcome: Ongoing     Problem: Altered Mood, Psychotic Behavior:  Goal: Able to demonstrate trust by eating, participating in treatment and following staff's direction  Description: Able to demonstrate trust by eating, participating in treatment and following staff's direction  8/17/2020 1315 by Sera Man RN  Outcome: Ongoing  8/17/2020 1315 by Sera Man RN  Outcome: Ongoing  Goal: Able to verbalize decrease in frequency and intensity of hallucinations  Description: Able to verbalize decrease in frequency and intensity of hallucinations  8/17/2020 1315 by Sera Man RN  Outcome: Ongoing  8/17/2020 1315 by Sera Man RN  Outcome: Ongoing  Goal: Able to verbalize reality based thinking  Description: Able to verbalize reality based thinking  8/17/2020 1315 by Sera Man RN  Outcome: Ongoing  8/17/2020 1315 by Sera Man RN  Outcome: Ongoing  Goal: Absence of self-harm  Description: Absence of self-harm  8/17/2020 1315 by Sera Man RN  Outcome: Ongoing  8/17/2020 1315 by Sera Man RN  Outcome: Ongoing  Goal: Ability to achieve adequate nutritional intake will improve  Description: Ability to achieve adequate nutritional intake will improve  8/17/2020 1315 by Sera Man RN  Outcome: Ongoing  8/17/2020 1315 by Sera Man RN  Outcome: Ongoing  Goal: Ability to interact with others will improve  Description: Ability to interact with others will improve  8/17/2020 1315 by Sera Man RN  Outcome: Ongoing  8/17/2020 1315 by Sera Man RN  Outcome: Ongoing  Goal: Compliance with prescribed medication regimen will improve  Description: Compliance with prescribed medication regimen will improve  8/17/2020 1315 by Wing Oneil RN  Outcome: Ongoing  8/17/2020 1315 by Wing Oneil RN  Outcome: Ongoing  Goal: Patient specific goal  Description: Patient specific goal  8/17/2020 1315 by Wing Oneil RN  Outcome: Ongoing  8/17/2020 1315 by Wing Oneil RN  Outcome: Ongoing     Problem: Anxiety:  Goal: Level of anxiety will decrease  Description: Level of anxiety will decrease  8/17/2020 1315 by Wing Oneil RN  Outcome: Ongoing  8/17/2020 1315 by Wing Oneil RN  Outcome: Ongoing

## 2020-08-17 NOTE — PROGRESS NOTES
Group Therapy Note    Start Time: 800  End Time:  546  Number of Participants: 9    Type of Group: Community Meeting       Patient's Goal:        Notes:  Patient did not attend group. Participation Level:        Participation Quality:        Thought Process/Content:       Affective Functioning:       Mood:       Level of consciousness:        Modes of Intervention: Support      Discipline Responsible: Behavioral Health Tech II      Signature:  Shaylee Emerson

## 2020-08-17 NOTE — ED PROVIDER NOTES
Social History     Socioeconomic History    Marital status:      Spouse name: None    Number of children: None    Years of education: None    Highest education level: None   Occupational History    None   Social Needs    Financial resource strain: None    Food insecurity     Worry: None     Inability: None    Transportation needs     Medical: None     Non-medical: None   Tobacco Use    Smoking status: Current Some Day Smoker     Packs/day: 1.00     Types: Cigarettes    Smokeless tobacco: Never Used   Substance and Sexual Activity    Alcohol use: No     Alcohol/week: 0.0 standard drinks    Drug use: No    Sexual activity: Yes     Partners: Male   Lifestyle    Physical activity     Days per week: None     Minutes per session: None    Stress: None   Relationships    Social connections     Talks on phone: None     Gets together: None     Attends [employer] service: None     Active member of club or organization: None     Attends meetings of clubs or organizations: None     Relationship status: None    Intimate partner violence     Fear of current or ex partner: None     Emotionally abused: None     Physically abused: None     Forced sexual activity: None   Other Topics Concern    None   Social History Narrative    None       SCREENINGS    [place] Coma Scale  Eye Opening: Spontaneous  Best Verbal Response: Oriented  Best Motor Response: Obeys commands  Maxime Coma Scale Score: 15        PHYSICAL EXAM    (up to 7 for level 4, 8 or more for level 5)     ED Triage Vitals [08/17/20 0055]   BP Temp Temp Source Pulse Resp SpO2 Height Weight   132/76 98 °F (36.7 °C) Oral 88 20 97 % 5' 5\" (1.651 m) 190 lb (86.2 kg)       Physical Exam  Vitals signs and nursing note reviewed. Constitutional:       General: She is not in acute distress. Appearance: Normal appearance. HENT:      Head: Normocephalic and atraumatic.       Right Ear: External ear normal.      Left Ear: External ear normal. Nose: Nose normal.      Mouth/Throat:      Mouth: Mucous membranes are moist.      Pharynx: Oropharynx is clear. No oropharyngeal exudate. Eyes:      General: No scleral icterus. Conjunctiva/sclera: Conjunctivae normal.      Pupils: Pupils are equal, round, and reactive to light. Neck:      Musculoskeletal: Neck supple. No neck rigidity. Cardiovascular:      Rate and Rhythm: Normal rate and regular rhythm. Pulses: Normal pulses. Heart sounds: Normal heart sounds. Pulmonary:      Effort: Pulmonary effort is normal.      Breath sounds: Normal breath sounds. Abdominal:      General: Bowel sounds are normal.      Palpations: Abdomen is soft. Tenderness: There is no abdominal tenderness. There is no guarding. Musculoskeletal:         General: No tenderness or deformity. Skin:     General: Skin is warm and dry. Capillary Refill: Capillary refill takes less than 2 seconds. Coloration: Skin is not jaundiced. Neurological:      General: No focal deficit present. Mental Status: She is alert and oriented to person, place, and time. Mental status is at baseline. Coordination: Coordination normal.   Psychiatric:         Attention and Perception: She is inattentive. Mood and Affect: Mood is anxious. Affect is inappropriate. Speech: Speech is rapid and pressured. Behavior: Behavior is uncooperative and agitated. Thought Content: Thought content is paranoid and delusional.         Cognition and Memory: Cognition is impaired. Judgment: Judgment is impulsive.          DIAGNOSTIC RESULTS     RADIOLOGY:  Non-plain film images such as CT, Ultrasound and MRI are read by the radiologist. Plain radiographic images are visualized and preliminarily interpreted bythe emergency physician with the below findings:          No orders to display           LABS:  Labs Reviewed   CBC WITH AUTO DIFFERENTIAL - Abnormal; Notable for the following components: Result Value    WBC 17.8 (*)     MCH 31.9 (*)     Neutrophils % 76.7 (*)     Lymphocytes % 14.2 (*)     Neutrophils Absolute 13.6 (*)     Monocytes Absolute 1.50 (*)     All other components within normal limits   COMPREHENSIVE METABOLIC PANEL - Abnormal; Notable for the following components:    CO2 17 (*)     Glucose 146 (*)     All other components within normal limits   URINE DRUG SCREEN - Abnormal; Notable for the following components:    Cannabinoid Scrn, Ur Positive (*)     All other components within normal limits   URINE RT REFLEX TO CULTURE - Abnormal; Notable for the following components:    Clarity, UA CLOUDY (*)     Ketones, Urine 15 (*)     Blood, Urine TRACE (*)     Protein, UA TRACE (*)     All other components within normal limits   MICROSCOPIC URINALYSIS - Abnormal; Notable for the following components:    Bacteria, UA NEGATIVE (*)     Crystals, UA NEG (*)     All other components within normal limits   ETHANOL   HCG, SERUM, QUALITATIVE   ACETAMINOPHEN LEVEL   SALICYLATE LEVEL       All other labs were within normal range or not returned as of this dictation. EMERGENCY DEPARTMENT COURSE and DIFFERENTIAL DIAGNOSIS/MDM:   Vitals:    Vitals:    08/17/20 0055 08/17/20 0315 08/17/20 0324   BP: 132/76 128/78 (!) 134/97   Pulse: 88 78 139   Resp: 20 20 18   Temp: 98 °F (36.7 °C) 98 °F (36.7 °C) 97 °F (36.1 °C)   TempSrc: Oral  Temporal   SpO2: 97% 98% 96%   Weight: 190 lb (86.2 kg)  185 lb 6.4 oz (84.1 kg)   Height: 5' 5\" (1.651 m)         MDM  69-year-old female with history of depression and borderline personality disorder presents with difficulty functioning. Lab results reviewed. ASHLEY evaluated patient in the emergency department. Dr. Friend Overall, psychiatry, admits patient to behavioral health for further evaluation and treatment.     CONSULTS:  IP CONSULT TO FAMILY MEDICINE  IP CONSULT TO SOCIAL WORK    PROCEDURES:  Unless otherwise noted below, none     Procedures    FINAL IMPRESSION      1. [facility]          DISPOSITION/PLAN   DISPOSITION admit to behavioral health      PATIENT REFERRED TO:  No follow-up provider specified.     DISCHARGE MEDICATIONS:  Current Discharge Medication List             (Please note that portions of this note were completed with a voice recognition program.  Efforts were made to edit thedictations but occasionally words are mis-transcribed.)    Tiff Payton MD (electronically signed)  Attending Emergency Physician          Tiff Payton MD  08/17/20 2647

## 2020-08-17 NOTE — ED NOTES
Pt states \"follow my lead. I am manic and schizophrenic.   Keep doing what you are doing\"     Lindy Rivera, ROB  08/17/20 3277

## 2020-08-17 NOTE — PLAN OF CARE
Group Therapy Note     Date: 8/17/2020  Start Time: 1430  End Time:  5753  Number of Participants: 6     Type of Group: Cognitive Skills     Wellness Binder Information  Module Name:  emotional wellness  Session Number:  1     Patient's Goal:  validation of feelings     Notes:  pt was verbally prompted to attend group. Pt refused. Information about emotional wellness was provided. Status After Intervention:       Participation Level:      Participation Quality:         Speech:           Thought Process/Content:         Affective Functioning:         Mood:         Level of consciousness:          Response to Learning:         Endings:      Modes of Intervention:         Discipline Responsible: Psychoeducational Specialist        Signature:  Fátima Dangelo

## 2020-08-17 NOTE — ED NOTES
Pt placed in paper scrubs. Personal belongs removed and given to security. Suicidal Flowsheet Initiated. Sitter at bedside.       Bk Castillo RN  08/17/20 1827

## 2020-08-17 NOTE — H&P
Department of Psychiatry  Attending History and Physical - Adult         CHIEF COMPLAINT: Treatment noncompliance, disorganized thought process    History obtained from:  patient    HISTORY OF PRESENT ILLNESS:          The patient is a 30 y.o. female with previous psychiatric history of schizophrenia and bipolar disorder, who has been admitted to psychiatric unit secondary to manic behavior, treatment noncompliance and disorganized thought process. Patient has been seen in her room. She was lying down on the bed with closed eyes. Patient is poor historian and was only partially involved in interview. She reported that she feels \"tired\" and wants to get some rest.  She reported that she recently broke up with her boyfriend and has a nervous breakdown. However, when I asked patient for reason for her admission, she stated that she has mental health issues, which need to be addressed. When I asked patient what kind of help she needs for her mental health, she responded \"you doctor, you know better\". She reported that she has been diagnosed with schizophrenia or bipolar disorder, and has been taking psychotropic medication in the past.  However, patient stated that most recently she did not take any psychotropic medications, but was not able to provide information about name of medication, or timeframe when she was not taking her medications. Patient denies suicidal or homicidal ideations. She denies recent or current auditory and visual hallucinations. PSYCHIATRIC HISTORY:    Diagnoses: Schizophrenia, bipolar disorder  Suicide attempts/gestures: Endorses, but did not clarify amount of the suicidal attempt or ways how she tried to commit suicide  Prior hospitalizations:  \"Yes\", patient unable to provide more detailed information about previous hospitalization to [facility]  Medication trials: \"Multiple\", patient does not remember names of previously prescribed medications  Mental health contact: Lost to follow-up     Past Medical History:        Diagnosis Date    Depression     H/O borderline personality disorder     History of cervical cancer     Sciatica      Past Surgical History:        Procedure Laterality Date    COLPOSCOPY  2009    TUBAL LIGATION       Medications Prior to Admission:   Medications Prior to Admission: traZODone (DESYREL) 150 MG tablet, Take 150 mg by mouth as needed for Sleep  oxybutynin (DITROPAN XL) 10 MG extended release tablet, Take 1 tablet by mouth daily    Allergies:  Keppra [levetiracetam]    Social History:  Smoking - 0.25 PPD for almost 20 years  Alcohol -reported that he she drinks socially  Illicit drugs-smokes marijuana daily    Family History:   History reviewed. No pertinent family history. Psychiatric Family History  Patient is unable to give family history at this time. REVIEW OF SYSTEMS:  Unable to assess    PHYSICAL EXAM:    Vitals:  BP (!) 116/49   Pulse 51   Temp 98.6 °F (37 °C) (Temporal)   Resp 20   Ht 5' 5\" (1.651 m)   Wt 185 lb 6.4 oz (84.1 kg)   LMP 07/31/2020   SpO2 94%   BMI 30.85 kg/m²     Mental Status Examination:   Appearance: Poorly groomed, wearing hospital attire, made no eye contact. Behavior: Withdrawn, partially cooperative with interview. Mild to moderate psychomotor retardation appreciated. Speech: Normal in tone, volume, and quality. Mood: \"Tired\"   Affect: Mood congruent. Range is flat and restricted. Thought Process: Mostly tangential, sometimes disorganized  Thought Content: Patient does not have any current active suicidal and homicidal ideations. No delusions or paranoia appreciated  Perceptions: Seems patient does not have any auditory or visual hallucinations at present time. Patient did not appear to be responding to internal stimuli. Executive Functions: Appear impaired.    Concentration: Poor  Reasoning: Appears impaired based on interaction from interview   Orientation: to person, place and situation. Alert. Language: Intact. Fund of information: Intact. Memory: recent and remote appear intact. Impulsivity: Poor  Neurovegitative: Decreased appetite, decreased sleep  Insight: Poor  Judgment: Poor    Physical Exam:  Patient refused    DATA:  Labs:    CBC:   Lab Results   Component Value Date    WBC 17.8 08/17/2020    RBC 4.92 08/17/2020    HGB 15.7 08/17/2020    HCT 46.1 08/17/2020    HCT 42.9 04/16/2012    MCV 93.7 08/17/2020    MCH 31.9 08/17/2020    MCHC 34.1 08/17/2020    RDW 12.9 08/17/2020     08/17/2020     04/16/2012    MPV 11.3 08/17/2020     CMP:    Lab Results   Component Value Date     08/17/2020     04/16/2012    K 3.6 08/17/2020    K 3.8 04/16/2012     08/17/2020     04/16/2012    CO2 17 08/17/2020    BUN 9 08/17/2020    CREATININE 0.8 08/17/2020    CREATININE 0.7 04/16/2012    GFRAA >59 08/17/2020    LABGLOM >60 08/17/2020    GLUCOSE 146 08/17/2020    PROT 8.2 08/17/2020    PROT 7.2 10/17/2012    LABALBU 4.7 08/17/2020    LABALBU 4.8 04/16/2012    CALCIUM 9.4 08/17/2020    BILITOT 0.7 08/17/2020    ALKPHOS 65 08/17/2020    ALKPHOS 72 04/16/2012    AST 25 08/17/2020    ALT 31 08/17/2020       DSM 5 DIAGNOSIS:  Schizoaffective disorder, bipolar type  Treatment noncompliance  Cannabis use disorder, severe      Plan:   -Admit to [facility] adult Unit and monitor on 15 minute checks  -Gloria Pretty reviewed. -Gather collateral information from family with release  -Medical monitoring to be performed by Dr. Rita Mesa and associates  -Acclimate to the unit. -Encourage participation in groups and therapeutic activities as appropriate.  -Medications: Will restart patient's home medications at previously prescribed recommended dose due to treatment noncompliance.   Will start on Risperdal 1 mg twice a day for psychosis  Will start on Depakote ER 1000 mg once a day for mood stabilization    -The risks, benefits, side effects, indications, contraindications, and

## 2020-08-17 NOTE — BH NOTE
[facility] ADULT INITIAL INTAKE ASSESSMENT     8/17/20    Jhony Patricia ,a 30 y.o. female, presents to the ED for a psychiatric assessment. ED Arrival time: 00;39  ED physician: [email or web address]   [facility] Notification time: in ED   [facility] Assessment start time: 02:38  Psychiatrist call time: 03:00  Spoke with Dr. Maki Marquez    Patient is referred by: Self in a private vehicle    Reason for visit to ED - Presenting problem:     PT states reason for ED visit, unable to assess pt . States she is schizo and stuck on stupid. Pt became alarmed and started crying that she was schizo and scared and did not want me to stop talking to her because she is schizophreniac. Duration of symptoms: TORY     Current Stressors: unknown    C-SSRS Completed: TORY    SI:  TORY  Plan:    Past SI attempts: If yes, when and how many times:  Describe suicide attempts:   HI: TORY  If yes describe:   Delusions: TORY   If yes describe:   Hallucinations: TORY  If yes describe:   Risk of Harm to self: Self injurious/self mutilation behaviors   If yes explain:   Was it within the past 6 months:    Risk of Harm to others: If yes explain:   Was it within the past 6 months:    Trauma History:  Anxiety 1-10:    Explain if increased:   Depression 1-10:    Explain if increased:   Level of function outside hospital decreased: yes   If yes explain:       Psychiatric Hospitalizations: Yes mom  Where & When:   Outpatient Psychiatric Treatment:    Family History:    Family history of mental illness: yes TORY    Family members with suicide attempt: TORY   If yes explain (attempted or completed):    Substance Abuse History:     SBIRT Completed: no TORY  Brief Intervention completed if needed:  (Yes/No)    Current ETOH LEVELS: <10    ETOH Usage: TORY    Amount drinking daily:    Date of last drink:   Longest period of sobriety:    Substance/Chemical Abuse/Recreational Drug History:  Substance used:   Date of last substance use:    Tobacco Use:   History of rehab treatment:  How many provided:      Quirino Huntley RN

## 2020-08-17 NOTE — PROGRESS NOTES
BHI Daily Shift Assessment  Nursing Progress Note    Room: [facility]625A-01 Name: Brad Lopez Age: 30 y.o. Ethnicity:  Gender: female   Dx: Schizophrenia  Precautions: suicide risk  CPAP: No Accu-Chek: No  MSE:  Status and Exam  Normal: Yes  Facial Expression: Exaggerated, Elevated  Affect: Appropriate  Level of Consciousness: Alert  Mood:Normal: No  Mood: Anxious  Motor Activity:Normal: No  Motor Activity: Decreased  Interview Behavior: Cooperative  Preception: [place] to Person, [place]man Boers to Time, [place] to Place, [place] to Situation  Attention:Normal: No  Attention: Unable to Concentrate  Thought Processes: Blocking, Flt.of Ideas  Thought Content:Normal: No  Thought Content: Preoccupations  Hallucinations: Unable to assess  Delusions: No  Memory:Normal: Yes  Insight and Judgment: No  Insight and Judgment: Poor Insight  Present Suicidal Ideation: No  Present Homicidal Ideation: No  Sleep: Yes, Good, no sleep issues Hours Slept: 7 Sched Sleep Meds: Yes PRN Sleep Meds: No Other PRN Meds: Yes Med Compliant: Yes Appetite: good Percent Meals: 75% Social: yes ADLs: Yes Speech: pressured Depression: 0 Anxiety: 5    Patient calm and cooperative, appearance poor, thought disorganized, alert/oriented, activities and self care attempted, reports no SI, HI or AVH.     Dave Donohue RN

## 2020-08-17 NOTE — PLAN OF CARE
Group Therapy Note     Date: 8/17/2020  Start Time: 1100  End Time:  4491  Number of Participants: 6     Type of Group: Psychoeducation     Wellness Binder Information  Module Name:  emotional wellness  Session Number:  5     Patient's Goal:  obstacles to emotional wellness     Notes:  pt was verbally prompted to attend group. Pt refused. Information about obstacles to wellness was provided. Status After Intervention:       Participation Level:      Participation Quality:         Speech:           Thought Process/Content:         Affective Functioning:         Mood:         Level of consciousness:          Response to Learning:         Endings:      Modes of Intervention:         Discipline Responsible: Psychoeducational Specialist        Signature:  Naye Mejia

## 2020-08-17 NOTE — PROGRESS NOTES
Treatment Team Note:    SCAR met with [address] team to discuss Pts [address] plans. Progress/Behavior/Group Attendance: TBD    Target Symptoms/Reason for admission: Patient [facility] to [facility] to [facility] presents to the emergency department with manic behavior.  Mother tells staff that patient's boyfriend just broke up with her. Chris Barraza is unable to give history or follow most simple commands at this time.  Patient appears very anxious and just keeps repeating phrases over and over such as \"focus. \"  And \"repeat after me. \" Chris Barraza is unable to tell me why she is here today  Diagnoses: Schizoaffective disorder, bipolar type, Treatment noncompliance, Cannabis use disorder, severe  UDS: Cannabinoid  BAL:  Neg    AftercarePlan: [address] with: mom    Collateral obtained from:mom  On:8/17/2020    Family Session: JESUS    Misc:

## 2020-08-17 NOTE — PROGRESS NOTES
Collateral obtained from: patients woo Berry [phone]    Immediate Stressors & Time Episode Began: Patient has a mental health breakdown because of her boyfriend broke up with her last Sunday. Patient has been with her boyfriend for 5 years. Patient boyfriend was laying in the bed with her boyfriend and she overheard him on the phone with another woman. Patient has run out of her medication and was unable to sleep. Patient has some of her medication but it was not in the correct pill bottles. Patient has got pulled over by the police and the patient called her mom to come to pick her up. Patient mom has custody of the patients 3 children. Diagnosis/Hx of compliance with meds: Not taking medication    Tx Hx/Past hospitalizations:  Previous admissions. Family hx of psychiatric issues: Previous admission to [facility] and [facility]    Substance Abuse: History of substance. Pending Legal: No issues    Safety Issues (Weapons? Hx of attempts): No issues    Support system/Medication Managed by: The importance of medication management and locking extra medication in a secured location was explained and reccommended to collateral.     Additional Info: Patient is going to live with her mom when she discharges.

## 2020-08-18 LAB
HBA1C MFR BLD: 5.3 % (ref 4–6)
TSH REFLEX FT4: 0.89 UIU/ML (ref 0.35–5.5)
VITAMIN B-12: 555 PG/ML (ref 211–946)
VITAMIN D 25-HYDROXY: 23 NG/ML

## 2020-08-18 PROCEDURE — 6370000000 HC RX 637 (ALT 250 FOR IP): Performed by: PSYCHIATRY & NEUROLOGY

## 2020-08-18 PROCEDURE — 84443 ASSAY THYROID STIM HORMONE: CPT

## 2020-08-18 PROCEDURE — 83036 HEMOGLOBIN GLYCOSYLATED A1C: CPT

## 2020-08-18 PROCEDURE — 99233 SBSQ HOSP IP/OBS HIGH 50: CPT | Performed by: PSYCHIATRY & NEUROLOGY

## 2020-08-18 PROCEDURE — 82306 VITAMIN D 25 HYDROXY: CPT

## 2020-08-18 PROCEDURE — 1240000000 HC EMOTIONAL WELLNESS R&B

## 2020-08-18 PROCEDURE — 36415 COLL VENOUS BLD VENIPUNCTURE: CPT

## 2020-08-18 PROCEDURE — 82607 VITAMIN B-12: CPT

## 2020-08-18 RX ORDER — TRAZODONE HYDROCHLORIDE 50 MG/1
50 TABLET ORAL NIGHTLY
Status: DISCONTINUED | OUTPATIENT
Start: 2020-08-18 | End: 2020-08-19 | Stop reason: HOSPADM

## 2020-08-18 RX ORDER — ERGOCALCIFEROL 1.25 MG/1
50000 CAPSULE ORAL WEEKLY
Status: DISCONTINUED | OUTPATIENT
Start: 2020-08-18 | End: 2020-08-19 | Stop reason: HOSPADM

## 2020-08-18 RX ADMIN — DIVALPROEX SODIUM 1000 MG: 500 TABLET, EXTENDED RELEASE ORAL at 08:10

## 2020-08-18 RX ADMIN — TRAZODONE HYDROCHLORIDE 50 MG: 50 TABLET ORAL at 20:13

## 2020-08-18 RX ADMIN — RISPERIDONE 1 MG: 1 TABLET ORAL at 20:13

## 2020-08-18 RX ADMIN — RISPERIDONE 1 MG: 1 TABLET ORAL at 08:10

## 2020-08-18 RX ADMIN — OXYBUTYNIN CHLORIDE 10 MG: 5 TABLET, EXTENDED RELEASE ORAL at 08:10

## 2020-08-18 NOTE — PLAN OF CARE
Problem: Altered Mood, Manic Behavior:  Goal: Able to verbalize decrease in frequency and intensity of racing thoughts  8/18/2020 1553 by Urban Medley  Outcome: Ongoing      Group Therapy Note     Date: 8/18/2020  Start Time: 1430  End Time:  5312  Number of Participants: 7     Type of Group: Cognitive Skills     Wellness Binder Information  Module Name:  emotional wellness  Session Number:  5     Patient's Goal:  obstacles to emotional wellness     Notes:  pt acknowledged negative thinking as an obstacle to emotional wellness.      Status After Intervention:  Improved     Participation Level: Interactive     Participation Quality: Appropriate, Attentive, and Sharing        Speech:  normal        Thought Process/Content: Logical        Affective Functioning: Congruent        Mood: congruent        Level of consciousness:  Alert, Oriented x4, and Attentive        Response to Learning: Able to verbalize current knowledge/experience        Endings: None Reported     Modes of Intervention: Education        Discipline Responsible: Psychoeducational Specialist        Signature:  Urban Medley

## 2020-08-18 NOTE — H&P
HISTORY and PHYSICAL      CHIEF COMPLAINT:  Psychosis    Reason for Admission:  Psychosis    History Obtained From:  Patient, chart    HISTORY OF PRESENT ILLNESS:      The patient is a 30 y.o. female who is admitted to the [place] [address] unit with worsening mood issues. She has no c/o CP or SOA. No abdominal pain or N/V. No dysuria. No weakness or HA. No new pain issues. No fevers. Past Medical History:        Diagnosis Date    Depression     H/O borderline personality disorder     History of cervical cancer     Sciatica      Past Surgical History:        Procedure Laterality Date    COLPOSCOPY  2009    TUBAL LIGATION           Medications Prior to Admission:    Medications Prior to Admission: traZODone (DESYREL) 150 MG tablet, Take 150 mg by mouth as needed for Sleep  oxybutynin (DITROPAN XL) 10 MG extended release tablet, Take 1 tablet by mouth daily    Allergies:  Keppra [levetiracetam]    Social History:   TOBACCO:   reports that she has been smoking cigarettes. She has been smoking about 1.00 pack per day. She has never used smokeless tobacco.  ETOH:   reports no history of alcohol use. DRUGS:   reports no history of drug use. MARITAL STATUS:    OCCUPATION:  Not working  Patient currently is homeless      Family History:   History reviewed. No pertinent family history.   REVIEW OF SYSTEMS:  Constitutional: neg  CV: neg  Pulmonary: neg  GI: neg  : neg  Psych: psychosis  Neuro: neg  Skin: neg  MusculoSkeletal: neg  HEENT: neg  Joints: neg    Vitals:  /70   Pulse 96   Temp 98.6 °F (37 °C) (Temporal)   Resp 17   Ht 5' 5\" (1.651 m)   Wt 185 lb 6.4 oz (84.1 kg)   LMP 07/31/2020   SpO2 98%   BMI 30.85 kg/m²     PHYSICAL EXAM:  Gen: NAD, alert  HEENT: WNL  Lymph: no LAD  Neck: no JVD or masses  Chest: CTA bilat  CV: RRR  Abdomen: NT/ND  Extrem: no C/C/E  Neuro: non focal  Skin: no rashes  Joints: no redness    DATA:  I have reviewed the admission labs and imaging tests.     ASSESSMENT AND PLAN:      Active Problems:    Schizoaffective disorder, bipolar type---follow with Psych    THC Use    Hyperglycemia    Leukocytosis--no s/s of infection        Hetal Alaniz MD  11:18 PM 8/17/2020

## 2020-08-18 NOTE — PROGRESS NOTES
Group Therapy Note    Start Time: 800  End Time:  900  Number of Participants: 12    Type of Group: Community Meeting       Patient's Goal:  \"Try not to drive everybody insane\"      Notes:        Participation Level:  Active Listener       Participation Quality: Appropriate      Thought Process/Content: Logical      Affective Functioning: Congruent      Mood: Calm      Level of consciousness:  Alert      Modes of Intervention: Support      Discipline Responsible: Behavioral Health Tech II      Signature:  Renzo Grajeda

## 2020-08-18 NOTE — PLAN OF CARE
Problem: Altered Mood, Manic Behavior:  Goal: Able to sleep  Outcome: Ongoing  Goal: Able to verbalize decrease in frequency and intensity of racing thoughts  8/18/2020 1550 by Fiordaliza Sanchez RN  Outcome: Ongoing  8/18/2020 1233 by Gulshan Dumont  Outcome: Ongoing  Goal: Ability to disclose and discuss suicidal ideas will improve  Outcome: Ongoing  Goal: Absence of self-harm  Outcome: Ongoing  Goal: Ability to achieve adequate nutritional intake will improve  Outcome: Ongoing  Goal: Ability to interact with others will improve  8/18/2020 1550 by Fiordaliza Sanchez RN  Outcome: Ongoing  8/18/2020 1417 by Constanza Rees  Outcome: Ongoing  Note:                                                                     Group Therapy Note    Date: 8/18/2020  Start Time: 1000  End Time:  1100  Number of Participants: 11    Type of Group: Psychoeducation    Wellness Binder Information  Module Name:  Men's Ines  Session Number:  1    Group Goal for Pt: To improve knowledge of effective stress management techniques    Notes:  Pt demonstrated improved knowledge of effective stress management techniques by actively participating in group discussion.     Status After Intervention:  Unchanged    Participation Level: Minimal    Participation Quality: Attentive      Speech:  normal      Thought Process/Content: Linear      Affective Functioning: Congruent      Mood: anxious and depressed      Level of consciousness:  Alert      Response to Learning: Able to verbalize current knowledge/experience, Able to verbalize/acknowledge new learning, and Progressing to goal      Endings: None Reported    Modes of Intervention: Education      Discipline Responsible: Psychoeducational Specialist      Signature:  Constanza Rees    Goal: Ability to demonstrate self-control will improve  Outcome: Ongoing  Goal: Mood stable  Outcome: Ongoing  Goal: Patient specific goal  Outcome: Ongoing     Problem: Altered Mood, Psychotic Behavior:  Goal: Able to demonstrate trust by eating, participating in treatment and following staff's direction  Outcome: Ongoing  Goal: Able to verbalize decrease in frequency and intensity of hallucinations  Outcome: Ongoing  Goal: Able to verbalize reality based thinking  Outcome: Ongoing  Goal: Absence of self-harm  Outcome: Ongoing  Goal: Ability to achieve adequate nutritional intake will improve  Outcome: Ongoing  Goal: Ability to interact with others will improve  Outcome: Ongoing  Goal: Compliance with prescribed medication regimen will improve  Outcome: Ongoing  Goal: Patient specific goal  Outcome: Ongoing     Problem: Anxiety:  Goal: Level of anxiety will decrease  Outcome: Ongoing

## 2020-08-18 NOTE — PLAN OF CARE
Problem: Altered Mood, Manic Behavior:  Goal: Ability to interact with others will improve  8/18/2020 1606 by Joan Mccarty  Outcome: Ongoing     Group Therapy Note     Date: 8/18/2020  Start Time: 4305  End Time:  1600  Number of Participants: 8     Type of Group: Recovery     Wellness Binder Information  Module Name:  relapse prevention  Session Number:  3     Patient's Goal:  too much stress can lead to relapse     Notes:  pt acknowledged use of positive coping skills to decrease stress and help prevent relapse.      Status After Intervention:  Improved     Participation Level: Interactive     Participation Quality: Appropriate, Attentive, and Sharing        Speech:  normal        Thought Process/Content: Logical        Affective Functioning: Congruent        Mood: congruent        Level of consciousness:  Alert, Oriented x4, and Attentive        Response to Learning: Able to verbalize current knowledge/experience        Endings: None Reported     Modes of Intervention: Education        Discipline Responsible: Psychoeducational Specialist        Signature:  Joan Mccarty

## 2020-08-18 NOTE — PLAN OF CARE
Problem: Altered Mood, Manic Behavior:  Goal: Able to verbalize decrease in frequency and intensity of racing thoughts  Outcome: Ongoing       Group Therapy Note     Date: 8/18/2020  Start Time: 1100  End Time:  7150  Number of Participants: 7     Type of Group: Psychotherapy     Wellness Binder Information  Module Name:  staying well  Session Number:  1     Patient's Goal:  daily maintenance and coping skills     Notes:  pt acknowledged use of positive coping skills daily to help stay well.      Status After Intervention:  Improved     Participation Level: Interactive     Participation Quality: Appropriate, Attentive, and Sharing        Speech:  normal        Thought Process/Content: Logical        Affective Functioning: Congruent        Mood: congruent        Level of consciousness:  Alert, Oriented x4, and Attentive        Response to Learning: Able to verbalize current knowledge/experience        Endings: None Reported     Modes of Intervention: Education        Discipline Responsible: Psychoeducational Specialist        Signature:  Dorcas Barrera

## 2020-08-18 NOTE — PROGRESS NOTES
BHI Daily Shift Assessment  Nursing Progress Note    Room: [facility]01 Name: German Marx Age: 30 y.o. Gender: female   Dx: <principal problem not specified>  Precautions: suicide risk  Target Symptoms:   Accu-Chek: NoSleep: Yes,Sleep Quality Good SI No AVH denies [address] [place] [address]  ADLs: Yes Speech: normal Depression: 0 Anxiety: 10   Participation LevelActive Listener  Appetite: Good  Visitation: No   Participation QualityResistant    Notes: appearance and attire is clean and appropriate. Well groomed. Labile. Disorganized thinking with flight of ideas. Social but not attending groups. Tangential. Frequently tearful throughout day. Appetite is good. Reports good sleep.     Signature: Electronically signed by Becky Hernandez RN on 8/18/20 at 5:29 PM CDT

## 2020-08-18 NOTE — PROGRESS NOTES
Spoke to patients mother about her home medications and psychiatric history. Mother stated \"the only medications that she has been on in years is her bladder pill and then I found a bottle of trazodone 150 mg and was giving that to her but she was fighting her sleep and it was it working. I was doing all that I could to keep her safe and not have to take her to [place] but then her three kids, even her 25year old started to become afraid of her. She also was driving and six policemen said that she was not to drive so I hid her keys and took them to the [facility] house. She has also had problems with her boyfriend which is when this all started a week ago when they brokeup. I could whip his ass because he was texting another girl he works with saying he loved her laying in the same bed as my daughter. The only way I could get her to come here is when I told her he was in the hospital and we needed to go see him. I know she is mad at me but I had to do something to get her to come here. \"

## 2020-08-18 NOTE — PROGRESS NOTES
Unable to wake patient for interview, patient was sleepy and kept going back to sleep had been administered medication prior to assuming care of this patient.     Junaid Manriquez LPN

## 2020-08-18 NOTE — PROGRESS NOTES
Department of Psychiatry  Attending Progress Note      SUBJECTIVE:    The patient is a 30 y.o. female with previous psychiatric history of schizophrenia and bipolar disorder, who has been admitted to psychiatric unit secondary to manic behavior, treatment noncompliance and disorganized thought process. Patient has been seen in treatment team room with presence of the patient's nurse. She reported that she has mental health issues and wants mental health help. However, when I asked the patient what mental help does she need, she started crying and responded \" my boyfriend left me, I feel so bad\". During the interview patient denies major affective symptomatology, she denies depression, suicidal or homicidal ideations, denies auditory and visual hallucinations, denies delusions or paranoid ideations. Patient endorses only mild feeling of anxiety, which she rated as 3 out of 10, with 10 being the worst. She reported that she feels significantly better today and her mood is \"okay, maybe a little bit anxious\". She endorses good appetite and good quality of sleep, stated that she slept well yesterday during the day and during the night, and did not experience any difficulties to fall asleep or stay asleep. She is compliant with currently prescribed medications and denies any side effects. Patient attended a few group activities in the unit but did not participate in those activities. She is not social with medical staff or other patients in the unit, at least its related for her treatment plan. At the end of the interview, patient stated that she feels stable enough to leave hospital, as she wants to be with her children. OBJECTIVE    Physical  VITALS:  /75   Pulse 110   Temp 97.2 °F (36.2 °C) (Temporal)   Resp 18   Ht 5' 5\" (1.651 m)   Wt 185 lb 6.4 oz (84.1 kg)   LMP 2020   SpO2 97%   BMI 30.85 kg/m²   TEMPERATURE:  Current - Temp: 97.2 °F (36.2 °C);  Max - Temp  Av.9 °F (36.6 °C) Min: 97.2 °F (36.2 °C)  Max: 98.6 °F (37 °C)  RESPIRATIONS RANGE: Resp  Av.5  Min: 17  Max: 18  PULSE RANGE: Pulse  Av  Min: 96  Max: 110  BLOOD PRESSURE RANGE:  Systolic (88YJQ), OXC:420 , Min:121 , QL   ; Diastolic (88PWY), WYV:36, Min:70, Max:75      Review of Systems: 14 point review of systems is negative    Psychological ROS: Positive for anxiety    Mental Status Examination:   Appearance: Appropriately groomed and in hospital attire. Made intermittent eye contact. Behavior: Anxious, frequently tearful, partially cooperative with interview. Mild psychomotor retardation appreciated. Gait unremarkable. Speech: Normal in tone, volume, and quality. Mood: \"Okay, may be a little anxious\"   Affect: Mood congruent. Range is labile  Thought Process: Mostly tangential  Thought Content: Patient does not have any current active suicidal and homicidal ideations. No overt delusions or paranoia appreciated. Perceptions: Seems patient does not have any auditory or visual hallucinations at present time. Patient did not appear to be responding to internal stimuli. Orientation: to person, place and situation. Alert. Language: Intact. Fund of information: Intact. Memory: recent and remote appear intact.    Impulsivity: Fair  Neurovegitative: Good appetite, good sleep  Insight: Limited  Judgment: Limited    Data  Lab Results   Component Value Date    TSHFT4 0.89 2020    TSH 1.64 2014     Lab Results   Component Value Date    LDOJFFXN95 180 2020     Lab Results   Component Value Date    VITD25 23.0 (L) 2020       Medications  Current Facility-Administered Medications: traZODone (DESYREL) tablet 50 mg, 50 mg, Oral, Nightly  acetaminophen (TYLENOL) tablet 650 mg, 650 mg, Oral, Q4H PRN  polyethylene glycol (GLYCOLAX) packet 17 g, 17 g, Oral, Daily PRN  nicotine (NICODERM CQ) 21 MG/24HR 1 patch, 1 patch, Transdermal, Daily  oxybutynin (DITROPAN-XL) extended release tablet 10 mg, 10 mg, Oral, Daily  divalproex (DEPAKOTE ER) extended release tablet 1,000 mg, 1,000 mg, Oral, Daily  risperiDONE (RISPERDAL) tablet 1 mg, 1 mg, Oral, BID  haloperidol lactate (HALDOL) injection 10 mg, 10 mg, Intramuscular, Q6H PRN  LORazepam (ATIVAN) injection 2 mg, 2 mg, Intramuscular, Q6H PRN    ASSESSMENT AND PLAN    DSM 5 DIAGNOSIS:  Schizoaffective disorder, bipolar type  Treatment noncompliance  Cannabis use disorder, severe  Vitamin D deficiency  Intellectual disability, mild    Plan:   1. Psychiatric Medications:   Continue current psychotropic medications as recommended. Patient denies side effect of currently prescribed medications. No changes with dose of prescribed psychotropic medications today. 2. Medical Issues:    Continue medical monitoring by Dr. Hattie Eagle and associates. Will start on vitamin D 16138 units weekly for vitamin D deficiency. 3. Disposition:    Patient is still on 72 hours hold. Discharge patient home when she will be in stable mental condition and adjustment psychotropic medications will be done.      Amount of time spent with patient:    35 minutes with greater than 50% of the time spent in counseling and collaboration of care

## 2020-08-19 VITALS
BODY MASS INDEX: 30.89 KG/M2 | HEIGHT: 65 IN | RESPIRATION RATE: 18 BRPM | HEART RATE: 107 BPM | SYSTOLIC BLOOD PRESSURE: 131 MMHG | OXYGEN SATURATION: 100 % | DIASTOLIC BLOOD PRESSURE: 62 MMHG | WEIGHT: 185.4 LBS | TEMPERATURE: 95.3 F

## 2020-08-19 PROCEDURE — 6370000000 HC RX 637 (ALT 250 FOR IP): Performed by: PSYCHIATRY & NEUROLOGY

## 2020-08-19 PROCEDURE — 99239 HOSP IP/OBS DSCHRG MGMT >30: CPT | Performed by: PSYCHIATRY & NEUROLOGY

## 2020-08-19 PROCEDURE — 5130000000 HC BRIDGE APPOINTMENT

## 2020-08-19 RX ORDER — RISPERIDONE 1 MG/1
1 TABLET, FILM COATED ORAL 2 TIMES DAILY
Qty: 60 TABLET | Refills: 1 | Status: SHIPPED | OUTPATIENT
Start: 2020-08-19

## 2020-08-19 RX ORDER — DIVALPROEX SODIUM 500 MG/1
1000 TABLET, EXTENDED RELEASE ORAL DAILY
Qty: 60 TABLET | Refills: 1 | Status: SHIPPED | OUTPATIENT
Start: 2020-08-20

## 2020-08-19 RX ORDER — ERGOCALCIFEROL 1.25 MG/1
50000 CAPSULE ORAL WEEKLY
Qty: 11 CAPSULE | Refills: 0 | Status: SHIPPED | OUTPATIENT
Start: 2020-08-19

## 2020-08-19 RX ADMIN — OXYBUTYNIN CHLORIDE 10 MG: 5 TABLET, EXTENDED RELEASE ORAL at 07:56

## 2020-08-19 RX ADMIN — RISPERIDONE 1 MG: 1 TABLET ORAL at 07:56

## 2020-08-19 RX ADMIN — DIVALPROEX SODIUM 1000 MG: 500 TABLET, EXTENDED RELEASE ORAL at 07:56

## 2020-08-19 NOTE — PLAN OF CARE
Group Therapy Note     Date: 8/19/2020  Start Time: 1100  End Time:  6210  Number of Participants: 8     Type of Group: Psychotherapy     Wellness Binder Information  Module Name:  staying well  Session Number:  1     Patient's Goal:  daily maintenance and coping skills     Notes:  pt acknowledged use of positive coping skills daily to help stay well.      Status After Intervention:  Improved     Participation Level: Interactive     Participation Quality: Appropriate, Attentive, and Sharing        Speech:  normal        Thought Process/Content: Logical        Affective Functioning: Congruent        Mood: congruent        Level of consciousness:  Alert, Oriented x4, and Attentive        Response to Learning: Able to verbalize current knowledge/experience        Endings: None Reported     Modes of Intervention: Education        Discipline Responsible: Psychoeducational Specialist        Signature:  Barron Hamlin

## 2020-08-19 NOTE — PLAN OF CARE
Problem: Altered Mood, Manic Behavior:  Goal: Able to sleep  Outcome: Completed  Goal: Able to verbalize decrease in frequency and intensity of racing thoughts  Outcome: Completed  Goal: Ability to disclose and discuss suicidal ideas will improve  Outcome: Completed  Goal: Absence of self-harm  Outcome: Completed  Goal: Ability to achieve adequate nutritional intake will improve  Outcome: Completed  Goal: Ability to interact with others will improve  Outcome: Completed  Goal: Ability to demonstrate self-control will improve  Outcome: Completed  Goal: Mood stable  Outcome: Completed  Goal: Patient specific goal  Outcome: Completed     Problem: Altered Mood, Psychotic Behavior:  Goal: Able to demonstrate trust by eating, participating in treatment and following staff's direction  Outcome: Completed  Goal: Able to verbalize decrease in frequency and intensity of hallucinations  Outcome: Completed  Goal: Able to verbalize reality based thinking  Outcome: Completed  Goal: Absence of self-harm  Outcome: Completed  Goal: Ability to achieve adequate nutritional intake will improve  Outcome: Completed  Goal: Ability to interact with others will improve  Outcome: Completed  Goal: Compliance with prescribed medication regimen will improve  Outcome: Completed  Goal: Patient specific goal  Outcome: Completed     Problem: Anxiety:  Goal: Level of anxiety will decrease  Outcome: Completed

## 2020-08-19 NOTE — DISCHARGE SUMMARY
Diagnoses: Schizophrenia, bipolar disorder  Suicide attempts/gestures: Endorses, but did not clarify amount of the suicidal attempt or ways how she tried to commit suicide  Prior hospitalizations: \"Yes\", patient unable to provide more detailed information about previous hospitalization to mental health Hospital  Medication trials: \"Multiple\", patient does not remember names of previously prescribed medications  Mental health contact: Lost to follow-up       Hospital Course:   Patient was admitted to the adult psych behavioral health floor and was acclimated to the floor. Labs were reviewed and physical exam was completed by Dr. Jose Alejandro Colin and associates. Home medications were reconciled. MARV was obtained and reviewed. Medication changes were made and patient tolerated well with no side effects. During the hospital stay patient's home medications have been restarted at previously prescribed and recommended dose. Patient has been started on Depakote ER 1000 mg once a day for mood stabilization and risperidone 1 mg twice a day for augmentation effect of the Depakote and psychosis. Initially, patient spent most of the time sleeping in her bed, and left her room only for meals or medications. She did not attend any group activities. However, during the hospital stay patient's condition became more stable with prescribed psychotropic medications and patient started to attend group activities in the unit, and she became more social with medical staff and other patients in the unit. She was compliant with prescribed medications. Patient was sleeping through the night. Behaviorally she was not a problem. This patient is not suicidal, homicidal or psychotic at discharge. She does not present an danger to self or others. With the above mentioned medications changes as well as psychotherapeutic interventions, the patient reported considerable improvement in her condition.  On 08/19/2020 patient requested to be discharged from the hospital back to her family.     Number of antipsychotic medication prescribed at discharge: One, Risperidone  IF MORE THAN ONE IS USED: NA    History of greater than 3 failed multiple monotherapy trials: NA  Monotherapy taper plan/ cross taper in progress: NA  Augmentation of Clozapine: NA    Referral to addiction treatment: Patient refused    Prescription for Alcohol or Drug Disorder Medication: There is no FDA approved medication for cannabis use disorder    Prescription for Tobacco Cessation medication: Patient refused    If no prescriptions for Tobacco Cessation must document why: Patient refused    Consults: Internal medicine    Significant Diagnostic Studies:   Recent Labs     08/17/20  0053   WBC 17.8*   RBC 4.92   HGB 15.7   HCT 46.1   MCV 93.7   MCH 31.9*   MCHC 34.1   RDW 12.9      MPV 11.3       Recent Labs     08/17/20  0053      K 3.6      CO2 17*   BUN 9   CREATININE 0.8   GLUCOSE 146*   CALCIUM 9.4   PROT 8.2   LABALBU 4.7   BILITOT 0.7   ALKPHOS 65   AST 25   ALT 31       Recent Labs     08/17/20  0047   BARBSCNU Negative   LABBENZ Negative        Treatments: therapies: RN and SW    Mental Status Examination:  Alert, Oriented X 4  Appearance:  Proper Grooming and Hygiene  Speech with Regular Rate and Rhythm  Eye Contact:  Good  No Psychomotor Agitation/Retardation Noted  Attitude:  Cooperative  Mood:  \"Good\"  Affective: Congruent, appropriate to the situation, with a normal range and intensity  Thought Processes:  Coherently communicated, logical and goal oriented  Thought Content:  No Suicidal Ideation, No Homicidal Ideation, No Auditory or Visual Hallucinations, No Overt Delusions  Insight: Limited  Judgement: Limited  Memory is intact for both remote and recent  Intellectual Functioning: Below average  Fund of Knowledge: Below average  Attention and Concentration:  Adequate      Discharge Exam:  Please, see medical note    Disposition: home    Patient Instructions:   Current Discharge Medication List      START taking these medications    Details   vitamin D (ERGOCALCIFEROL) 1.25 MG (31689 UT) CAPS capsule Take 1 capsule by mouth once a week  Qty: 11 capsule, Refills: 0      divalproex (DEPAKOTE ER) 500 MG extended release tablet Take 2 tablets by mouth daily  Qty: 60 tablet, Refills: 1      risperiDONE (RISPERDAL) 1 MG tablet Take 1 tablet by mouth 2 times daily  Qty: 60 tablet, Refills: 1         CONTINUE these medications which have NOT CHANGED    Details   traZODone (DESYREL) 150 MG tablet Take 150 mg by mouth as needed for Sleep      oxybutynin (DITROPAN XL) 10 MG extended release tablet Take 1 tablet by mouth daily  Qty: 30 tablet, Refills: 3    Associated Diagnoses: Overactive bladder         STOP taking these medications       cetirizine (ZYRTEC ALLERGY) 10 MG tablet Comments:   Reason for Stopping:             Activity: activity as tolerated  Diet: regular diet  Wound Care: none needed    Follow-up with [place] [address] provider in 2 weeks.     Time worked: More than 31 minutes    Participation: good    Electronically signed by Vivi Emerson MD on 8/19/2020 at 9:25 AM

## 2020-08-19 NOTE — PROGRESS NOTES
Group Therapy Note    Start Time: 800  End Time:  787  Number of Participants: 13    Type of Group: Community Meeting       Patient's Goal:  \"get my trazadon fixed\"      Notes:      Participation Level:  Active Listener       Participation Quality: Appropriate      Thought Process/Content: Logical      Affective Functioning: Congruent      Mood: calm      Level of consciousness:  Alert      Modes of Intervention: Support      Discipline Responsible: Behavioral Health Tech II      Signature:  Trude Krabbe

## 2020-08-19 NOTE — PROGRESS NOTES
[facility] Adult Unit Daily Assessment  Nursing Progress Note    Room: [facility]/[address]01   Name: Alan Prescott   Age: 30 y.o. Gender: female   Dx: <principal problem not specified>  Precautions: suicide risk  Inpatient Status: voluntary       SLEEP:    Sleep Quality Good  Sleep Medications: No   PRN Sleep Meds: No       MEDICAL:    Other PRN Meds: Yes   Med Compliant: No  Accu-Chek: No  Oxygen/CPAP/BiPAP: No  CIWA/CINA: No   PAIN Assessment: none  Side Effects from medication: No      PSYCH:    Depression: 0   Anxiety: 3   SI denies suicidal ideation   HI Negative for homicidal ideation      AVH:Absent      GENERAL:    Appetite: good    Social: Yes   Speech: hesitant   Appearance: appropriately dressed and healthy looking    GROUP:    Group Participation: Yes  Participation Quality: Active Listener    Notes:   Pt denies depression but has mild anxiety. Pt denies si, hi or avh. Pt is social with staff and peers. Pt is up at the desk asking questions repeatedly. Pt is very pleasant and able to be redirected.

## 2020-08-19 NOTE — BH NOTE
Group Therapy Note    Date: 8/19/2020  Start Time: 1330  End Time:  1400  Number of Participants: 6    Type of Group: Spirituality    Wellness Binder Information  Module Name:  Mindfulness  Session Number:      Patient's Goal:  Skills in relaxation and meditation    Notes:      Status After Intervention:  Improved    Participation Level:  Active Listener and Interactive    Participation Quality: Appropriate, Attentive and Sharing      Speech:  normal      Thought Process/Content:       Affective Functioning: Congruent      Mood: euthymic, calm      Level of consciousness:  Oriented x4 and Attentive      Response to Learning: Able to verbalize current knowledge/experience and Capable of insight      Endings:     Modes of Intervention: Education, Support and Activity      Discipline Responsible:       Signature:  Araceli Flowers MA [facility]

## 2020-08-19 NOTE — PLAN OF CARE
Group Therapy Note    Date: 8/19/2020  Start Time: 1000  End Time:  1100  Number of Participants: 9    Type of Group: Psychoeducation    Wellness Binder Information  Module Name:  [address] [place] [address]  Session Number:  1    Group Goal for Pt: To improve knowledge of practical facts about depression    Notes:  Pt demonstrated improved knowledge of practical facts about depression by actively participating in group activity. Status After Intervention:  Improved    Participation Level:  Active Listener and Interactive    Participation Quality: Appropriate and Attentive      Speech:  normal      Thought Process/Content: Logical      Affective Functioning: Congruent      Mood: anxious and depressed      Level of consciousness:  Alert and Oriented x4      Response to Learning: Able to verbalize current knowledge/experience, Able to verbalize/acknowledge new learning, and Progressing to goal      Endings: None Reported    Modes of Intervention: Education      Discipline Responsible: Psychoeducational Specialist      Signature:  Andrea Mccurdy

## 2020-08-19 NOTE — PROGRESS NOTES
Progress Note  Alan Prescott  8/18/2020 10:58 PM  Subjective:   Admit Date:   8/17/2020      CC/ADMIT DX:       Interval History:   Reviewed overnight events and nursing notes. No new physical complaints. I have reviewed all labs/diagnostics from the last 24hrs. ROS:   I have done a 10 point ROS and all are negative, except what is mentioned in the HPI. DIET GENERAL;    Medications:      traZODone  50 mg Oral Nightly    vitamin D  50,000 Units Oral Weekly    nicotine  1 patch Transdermal Daily    oxybutynin  10 mg Oral Daily    divalproex  1,000 mg Oral Daily    risperiDONE  1 mg Oral BID           Objective:   Vitals: /78   Pulse 113   Temp 97.9 °F (36.6 °C) (Temporal)   Resp 16   Ht 5' 5\" (1.651 m)   Wt 185 lb 6.4 oz (84.1 kg)   LMP 07/31/2020   SpO2 97%   BMI 30.85 kg/m²  No intake or output data in the 24 hours ending 08/18/20 2720  General appearance: alert and cooperative with exam  Extremities: extremities normal, atraumatic, no cyanosis or edema  Neurologic:  No obvious focal neurologic deficits. SKin: no rashes    Assessment and Plan: Active Problems:    Schizoaffective disorder, bipolar type ([facility] [address]  Resolved Problems:    * No resolved hospital problems. *    Vit D Def    Plan:  1. Continue present medication(s)   2. Replace Vit D  3. Follow with Psych      Discharge planning:   her home     Reviewed treatment plans with the patient and/or family.              Electronically signed by Yandel Sawyer MD on 8/18/2020 at 10:58 PM

## 2020-08-19 NOTE — PROGRESS NOTES
[facility]  Discharge Note  Bridge Appointment completed: Reviewed Discharge Instructions with patient. Patient verbalizes understanding and agreement with the discharge plan using the teachback method. Referral for Outpatient Tobacco Cessation Counseling, upon discharge (mona X if applicable and completed):    ( )  Hospital staff assisted patient to call Quit Line or faxed referral                                   during hospitalization                  ( )  Recognizing danger situations (included triggers and roadblocks), if not completed on admission                    ( )  Coping skills (new ways to manage stress, exercise, relaxation techniques, changing routine, distraction), if not completed on admission                                                           ( )  Basic information about quitting (benefits of quitting, techniques in how to quit, available resources, if not completed on admission  ( ) Referral for counseling faxed to [facility]   (x ) Patient refused referral  ( x) Patient refused counseling  (x ) Patient refused smoking cessation medication upon discharge    Vaccinations (mona X if applicable and completed):  ( ) Patient states already received influenza vaccine elsewhere  ( ) Patient received influenza vaccine during this hospitalization  (x ) Patient refused influenza vaccine at this time      Pt discharged with followings belongings:       Patient did not have any valuables. Patient left department with [employer] and walked down to main entrance of hospital and left    via cab  , discharged to home  . Patient education on aftercare instructions: yes  Patient verbalize understanding of AVS:  yes. Suicidal Ideations? No AVH? denies HI?  Negative for homicidal ideation

## 2020-08-20 ENCOUNTER — OFFICE VISIT (OUTPATIENT)
Dept: INTERNAL MEDICINE | Facility: CLINIC | Age: 35
End: 2020-08-20

## 2020-08-20 VITALS
BODY MASS INDEX: 33.84 KG/M2 | WEIGHT: 191 LBS | SYSTOLIC BLOOD PRESSURE: 130 MMHG | OXYGEN SATURATION: 97 % | DIASTOLIC BLOOD PRESSURE: 88 MMHG | RESPIRATION RATE: 16 BRPM | HEIGHT: 63 IN | HEART RATE: 129 BPM

## 2020-08-20 DIAGNOSIS — F17.210 CIGARETTE SMOKER: ICD-10-CM

## 2020-08-20 DIAGNOSIS — B35.4 TINEA CORPORIS: Primary | ICD-10-CM

## 2020-08-20 DIAGNOSIS — G47.00 INSOMNIA, UNSPECIFIED TYPE: ICD-10-CM

## 2020-08-20 DIAGNOSIS — F39 MOOD DISORDER (HCC): ICD-10-CM

## 2020-08-20 PROCEDURE — 99214 OFFICE O/P EST MOD 30 MIN: CPT | Performed by: NURSE PRACTITIONER

## 2020-08-20 RX ORDER — DIVALPROEX SODIUM 500 MG/1
1000 TABLET, EXTENDED RELEASE ORAL DAILY
Qty: 20 TABLET | Refills: 0 | Status: SHIPPED | OUTPATIENT
Start: 2020-08-20 | End: 2021-03-11

## 2020-08-20 RX ORDER — RISPERIDONE 1 MG/1
1 TABLET ORAL 2 TIMES DAILY
COMMUNITY
Start: 2020-08-19 | End: 2020-08-20 | Stop reason: SDUPTHER

## 2020-08-20 RX ORDER — DIVALPROEX SODIUM 500 MG/1
1000 TABLET, EXTENDED RELEASE ORAL DAILY
COMMUNITY
Start: 2020-08-20 | End: 2020-08-20 | Stop reason: SDUPTHER

## 2020-08-20 RX ORDER — RISPERIDONE 1 MG/1
1 TABLET ORAL 2 TIMES DAILY
Qty: 20 TABLET | Refills: 0 | Status: SHIPPED | OUTPATIENT
Start: 2020-08-20 | End: 2020-10-22

## 2020-08-20 RX ORDER — TRAZODONE HYDROCHLORIDE 150 MG/1
150 TABLET ORAL NIGHTLY PRN
Qty: 10 TABLET | Refills: 0 | Status: SHIPPED | OUTPATIENT
Start: 2020-08-20 | End: 2020-09-09

## 2020-08-20 RX ORDER — ERGOCALCIFEROL 1.25 MG/1
50000 CAPSULE ORAL WEEKLY
COMMUNITY
Start: 2020-08-19 | End: 2021-02-08

## 2020-08-20 RX ORDER — TRAZODONE HYDROCHLORIDE 150 MG/1
150 TABLET ORAL NIGHTLY PRN
COMMUNITY
End: 2020-08-20 | Stop reason: SDUPTHER

## 2020-08-20 NOTE — PROGRESS NOTES
Progress Note  German Marx  8/19/2020 11:20 PM  Subjective:   Admit Date:   8/17/2020      CC/ADMIT DX:       Interval History:   Reviewed overnight events and nursing notes. She has no medical complaints. I have reviewed all labs/diagnostics from the last 24hrs. ROS:   I have done a 10 point ROS and all are negative, except what is mentioned in the HPI. No diet orders on file    Medications:             Objective:   Vitals: /62   Pulse 107   Temp 95.3 °F (35.2 °C) (Temporal)   Resp 18   Ht 5' 5\" (1.651 m)   Wt 185 lb 6.4 oz (84.1 kg)   LMP 07/31/2020   SpO2 100%   BMI 30.85 kg/m²  No intake or output data in the 24 hours ending 08/19/20 2320  General appearance: alert and cooperative with exam  Extremities: extremities normal, atraumatic, no cyanosis or edema  Neurologic:  No obvious focal neurologic deficits. SKin: no rashes    Assessment and Plan: Active Problems:    Schizoaffective disorder, bipolar type ([address]  Resolved Problems:    * No resolved hospital problems. *    Vit D Def    Plan:  1. Continue present medication(s)   2. She is medically stable. I will monitor for any changes or concerns. 3.  Follow with Psych      Discharge planning:   her home     Reviewed treatment plans with the patient and/or family.              Electronically signed by Elda Hernandez MD on 8/19/2020 at 11:20 PM

## 2020-08-26 ENCOUNTER — TELEPHONE (OUTPATIENT)
Dept: INTERNAL MEDICINE | Facility: CLINIC | Age: 35
End: 2020-08-26

## 2020-09-02 ENCOUNTER — TELEMEDICINE (OUTPATIENT)
Dept: INTERNAL MEDICINE | Facility: CLINIC | Age: 35
End: 2020-09-02

## 2020-09-02 DIAGNOSIS — L21.0 DANDRUFF IN ADULT: ICD-10-CM

## 2020-09-02 DIAGNOSIS — B35.4 TINEA CORPORIS: ICD-10-CM

## 2020-09-02 DIAGNOSIS — F17.210 CIGARETTE SMOKER: Primary | ICD-10-CM

## 2020-09-02 PROCEDURE — 99212 OFFICE O/P EST SF 10 MIN: CPT | Performed by: NURSE PRACTITIONER

## 2020-09-08 DIAGNOSIS — B35.0 TINEA CAPITIS: Primary | ICD-10-CM

## 2020-09-09 DIAGNOSIS — F39 MOOD DISORDER (HCC): ICD-10-CM

## 2020-09-09 RX ORDER — TRAZODONE HYDROCHLORIDE 150 MG/1
150 TABLET ORAL NIGHTLY PRN
Qty: 10 TABLET | Refills: 0 | Status: SHIPPED | OUTPATIENT
Start: 2020-09-09 | End: 2022-12-14

## 2020-09-10 ENCOUNTER — OFFICE VISIT (OUTPATIENT)
Dept: OBSTETRICS AND GYNECOLOGY | Facility: CLINIC | Age: 35
End: 2020-09-10

## 2020-09-10 VITALS
DIASTOLIC BLOOD PRESSURE: 80 MMHG | HEIGHT: 63 IN | SYSTOLIC BLOOD PRESSURE: 110 MMHG | WEIGHT: 187 LBS | BODY MASS INDEX: 33.13 KG/M2

## 2020-09-10 DIAGNOSIS — R10.2 PELVIC PAIN: Primary | ICD-10-CM

## 2020-09-10 DIAGNOSIS — R10.2 PELVIC PAIN: ICD-10-CM

## 2020-09-10 DIAGNOSIS — Z20.2 EXPOSURE TO STD: ICD-10-CM

## 2020-09-10 DIAGNOSIS — N73.0 ACUTE PID (PELVIC INFLAMMATORY DISEASE): Primary | ICD-10-CM

## 2020-09-10 PROCEDURE — 99214 OFFICE O/P EST MOD 30 MIN: CPT | Performed by: NURSE PRACTITIONER

## 2020-09-10 PROCEDURE — 96372 THER/PROPH/DIAG INJ SC/IM: CPT | Performed by: NURSE PRACTITIONER

## 2020-09-10 PROCEDURE — 87491 CHLMYD TRACH DNA AMP PROBE: CPT | Performed by: NURSE PRACTITIONER

## 2020-09-10 PROCEDURE — 87661 TRICHOMONAS VAGINALIS AMPLIF: CPT | Performed by: NURSE PRACTITIONER

## 2020-09-10 PROCEDURE — 87591 N.GONORRHOEAE DNA AMP PROB: CPT | Performed by: NURSE PRACTITIONER

## 2020-09-10 RX ORDER — DOXYCYCLINE HYCLATE 100 MG/1
100 CAPSULE ORAL 2 TIMES DAILY
Qty: 14 CAPSULE | Refills: 0 | Status: SHIPPED | OUTPATIENT
Start: 2020-09-10 | End: 2020-09-24

## 2020-09-10 RX ORDER — CEFTRIAXONE SODIUM 250 MG/1
250 INJECTION, POWDER, FOR SOLUTION INTRAMUSCULAR; INTRAVENOUS EVERY 24 HOURS
Status: COMPLETED | OUTPATIENT
Start: 2020-09-10 | End: 2020-09-10

## 2020-09-10 RX ORDER — CLOTRIMAZOLE 1 %
CREAM (GRAM) TOPICAL 2 TIMES DAILY
Qty: 60 G | Refills: 0 | Status: SHIPPED | OUTPATIENT
Start: 2020-09-10 | End: 2022-12-14

## 2020-09-10 RX ADMIN — CEFTRIAXONE SODIUM 250 MG: 250 INJECTION, POWDER, FOR SOLUTION INTRAMUSCULAR; INTRAVENOUS at 16:34

## 2020-09-11 DIAGNOSIS — F17.210 CIGARETTE SMOKER: ICD-10-CM

## 2020-09-12 LAB
HAV IGM SERPL QL IA: NEGATIVE
HBV CORE IGM SERPL QL IA: NEGATIVE
HBV SURFACE AG SERPL QL IA: NEGATIVE
HCV AB S/CO SERPL IA: <0.1 S/CO RATIO (ref 0–0.9)
HIV 1+2 AB+HIV1 P24 AG SERPL QL IA: NON REACTIVE
RPR SER QL: NON REACTIVE

## 2020-09-14 LAB
C TRACH RRNA CVX QL NAA+PROBE: NOT DETECTED
N GONORRHOEA RRNA SPEC QL NAA+PROBE: NOT DETECTED
TRICHOMONAS VAGINALIS PCR: NOT DETECTED

## 2020-09-17 ENCOUNTER — TRANSCRIBE ORDERS (OUTPATIENT)
Dept: ADMINISTRATIVE | Facility: HOSPITAL | Age: 35
End: 2020-09-17

## 2020-09-17 ENCOUNTER — LAB (OUTPATIENT)
Dept: LAB | Facility: HOSPITAL | Age: 35
End: 2020-09-17

## 2020-09-17 ENCOUNTER — OFFICE VISIT (OUTPATIENT)
Dept: OBSTETRICS AND GYNECOLOGY | Facility: CLINIC | Age: 35
End: 2020-09-17

## 2020-09-17 VITALS
DIASTOLIC BLOOD PRESSURE: 76 MMHG | BODY MASS INDEX: 33.84 KG/M2 | SYSTOLIC BLOOD PRESSURE: 118 MMHG | WEIGHT: 191 LBS | HEIGHT: 63 IN

## 2020-09-17 DIAGNOSIS — N73.0 ACUTE PID (PELVIC INFLAMMATORY DISEASE): Primary | ICD-10-CM

## 2020-09-17 DIAGNOSIS — Z79.899 ENCOUNTER FOR LONG-TERM (CURRENT) USE OF OTHER MEDICATIONS: Primary | ICD-10-CM

## 2020-09-17 LAB
CHOLEST SERPL-MCNC: 151 MG/DL (ref 0–200)
GLUCOSE P FAST SERPL-MCNC: 89 MG/DL (ref 74–106)
HBA1C MFR BLD: 5.44 % (ref 4.8–5.6)
HDLC SERPL-MCNC: 34 MG/DL (ref 40–60)
LDLC SERPL CALC-MCNC: 91 MG/DL (ref 0–100)
LDLC/HDLC SERPL: 2.68 {RATIO}
PROLACTIN SERPL-MCNC: 137 NG/ML (ref 4.79–23.3)
TRIGL SERPL-MCNC: 129 MG/DL (ref 0–150)
VALPROATE SERPL-MCNC: 38 MCG/ML (ref 50–125)
VLDLC SERPL-MCNC: 25.8 MG/DL (ref 5–40)

## 2020-09-17 PROCEDURE — 36415 COLL VENOUS BLD VENIPUNCTURE: CPT | Performed by: NURSE PRACTITIONER

## 2020-09-17 PROCEDURE — 80164 ASSAY DIPROPYLACETIC ACD TOT: CPT | Performed by: NURSE PRACTITIONER

## 2020-09-17 PROCEDURE — 82947 ASSAY GLUCOSE BLOOD QUANT: CPT | Performed by: NURSE PRACTITIONER

## 2020-09-17 PROCEDURE — 99213 OFFICE O/P EST LOW 20 MIN: CPT | Performed by: NURSE PRACTITIONER

## 2020-09-17 PROCEDURE — 84146 ASSAY OF PROLACTIN: CPT | Performed by: NURSE PRACTITIONER

## 2020-09-17 PROCEDURE — 80061 LIPID PANEL: CPT | Performed by: NURSE PRACTITIONER

## 2020-09-17 PROCEDURE — 83036 HEMOGLOBIN GLYCOSYLATED A1C: CPT | Performed by: NURSE PRACTITIONER

## 2020-10-22 ENCOUNTER — OFFICE VISIT (OUTPATIENT)
Dept: INTERNAL MEDICINE | Facility: CLINIC | Age: 35
End: 2020-10-22

## 2020-10-22 ENCOUNTER — LAB (OUTPATIENT)
Dept: LAB | Facility: HOSPITAL | Age: 35
End: 2020-10-22

## 2020-10-22 VITALS
HEIGHT: 62 IN | DIASTOLIC BLOOD PRESSURE: 88 MMHG | SYSTOLIC BLOOD PRESSURE: 122 MMHG | WEIGHT: 191.4 LBS | BODY MASS INDEX: 35.22 KG/M2 | OXYGEN SATURATION: 98 % | TEMPERATURE: 98.8 F | HEART RATE: 87 BPM

## 2020-10-22 DIAGNOSIS — Z23 NEED FOR INFLUENZA VACCINATION: ICD-10-CM

## 2020-10-22 DIAGNOSIS — N89.8 VAGINAL ITCHING: ICD-10-CM

## 2020-10-22 DIAGNOSIS — F39 MOOD DISORDER (HCC): ICD-10-CM

## 2020-10-22 DIAGNOSIS — R30.0 DYSURIA: ICD-10-CM

## 2020-10-22 DIAGNOSIS — E66.09 CLASS 2 OBESITY DUE TO EXCESS CALORIES WITHOUT SERIOUS COMORBIDITY WITH BODY MASS INDEX (BMI) OF 35.0 TO 35.9 IN ADULT: ICD-10-CM

## 2020-10-22 DIAGNOSIS — Z87.891 FORMER CIGARETTE SMOKER: ICD-10-CM

## 2020-10-22 DIAGNOSIS — Z00.01 ENCOUNTER FOR ANNUAL GENERAL MEDICAL EXAMINATION WITH ABNORMAL FINDINGS IN ADULT: Primary | ICD-10-CM

## 2020-10-22 PROCEDURE — 99395 PREV VISIT EST AGE 18-39: CPT | Performed by: NURSE PRACTITIONER

## 2020-10-22 PROCEDURE — 81003 URINALYSIS AUTO W/O SCOPE: CPT | Performed by: NURSE PRACTITIONER

## 2020-10-22 PROCEDURE — 99214 OFFICE O/P EST MOD 30 MIN: CPT | Performed by: NURSE PRACTITIONER

## 2020-10-22 PROCEDURE — 90471 IMMUNIZATION ADMIN: CPT | Performed by: NURSE PRACTITIONER

## 2020-10-22 PROCEDURE — 90686 IIV4 VACC NO PRSV 0.5 ML IM: CPT | Performed by: NURSE PRACTITIONER

## 2020-10-22 RX ORDER — RISPERIDONE 0.5 MG/1
0.5 TABLET ORAL DAILY
COMMUNITY
End: 2021-02-08

## 2020-10-22 RX ORDER — LURASIDONE HYDROCHLORIDE 40 MG/1
20 TABLET, FILM COATED ORAL DAILY
COMMUNITY
End: 2020-11-25

## 2020-10-22 RX ORDER — FLUCONAZOLE 150 MG/1
150 TABLET ORAL ONCE
Qty: 1 TABLET | Refills: 0 | Status: SHIPPED | OUTPATIENT
Start: 2020-10-22 | End: 2020-10-22

## 2020-10-23 LAB
BILIRUB UR QL STRIP: NEGATIVE
CLARITY UR: ABNORMAL
COLOR UR: YELLOW
GLUCOSE UR STRIP-MCNC: NEGATIVE MG/DL
HGB UR QL STRIP.AUTO: NEGATIVE
KETONES UR QL STRIP: ABNORMAL
LEUKOCYTE ESTERASE UR QL STRIP.AUTO: NEGATIVE
NITRITE UR QL STRIP: NEGATIVE
PH UR STRIP.AUTO: 6 [PH] (ref 5–8)
PROT UR QL STRIP: NEGATIVE
SP GR UR STRIP: >=1.03 (ref 1–1.03)
UROBILINOGEN UR QL STRIP: ABNORMAL

## 2020-11-03 ENCOUNTER — HOSPITAL ENCOUNTER (EMERGENCY)
Facility: HOSPITAL | Age: 35
Discharge: HOME | End: 2020-11-03
Admitting: EMERGENCY MEDICINE

## 2020-11-03 ENCOUNTER — APPOINTMENT (OUTPATIENT)
Dept: GENERAL RADIOLOGY | Facility: HOSPITAL | Age: 35
End: 2020-11-03

## 2020-11-03 VITALS
HEART RATE: 80 BPM | HEIGHT: 63 IN | OXYGEN SATURATION: 97 % | WEIGHT: 180 LBS | SYSTOLIC BLOOD PRESSURE: 135 MMHG | TEMPERATURE: 98.2 F | DIASTOLIC BLOOD PRESSURE: 74 MMHG | BODY MASS INDEX: 31.89 KG/M2 | RESPIRATION RATE: 16 BRPM
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
## 2020-11-03 DIAGNOSIS — S39.012A STRAIN OF LUMBAR REGION, INITIAL ENCOUNTER: ICD-10-CM

## 2020-11-03 LAB
B-HCG UR QL: NEGATIVE
INTERNAL NEGATIVE CONTROL: NEGATIVE
INTERNAL POSITIVE CONTROL: POSITIVE
Lab: NORMAL

## 2020-11-03 PROCEDURE — 25010000003 HYDROMORPHONE 1 MG/ML SOLUTION: Performed by: NURSE PRACTITIONER

## 2020-11-03 PROCEDURE — 72110 X-RAY EXAM L-2 SPINE 4/>VWS: CPT

## 2020-11-03 PROCEDURE — 81025 URINE PREGNANCY TEST: CPT | Performed by: NURSE PRACTITIONER

## 2020-11-03 PROCEDURE — 96372 THER/PROPH/DIAG INJ SC/IM: CPT

## 2020-11-03 PROCEDURE — 63710000001 ONDANSETRON ODT 4 MG TABLET DISPERSIBLE: Performed by: NURSE PRACTITIONER

## 2020-11-03 PROCEDURE — 99283 EMERGENCY DEPT VISIT LOW MDM: CPT

## 2020-11-03 RX ORDER — ONDANSETRON 4 MG/1
4 TABLET, ORALLY DISINTEGRATING ORAL ONCE
Status: COMPLETED | OUTPATIENT
Start: 2020-11-03 | End: 2020-11-03

## 2020-11-03 RX ORDER — ONDANSETRON 4 MG/1
4 TABLET, ORALLY DISINTEGRATING ORAL EVERY 6 HOURS PRN
Qty: 12 TABLET | Refills: 0 | Status: SHIPPED | OUTPATIENT
Start: 2020-11-03 | End: 2022-12-14

## 2020-11-03 RX ORDER — HYDROCODONE BITARTRATE AND ACETAMINOPHEN 5; 325 MG/1; MG/1
1 TABLET ORAL EVERY 4 HOURS PRN
Qty: 15 TABLET | Refills: 0 | Status: SHIPPED | OUTPATIENT
Start: 2020-11-03 | End: 2020-11-25

## 2020-11-03 RX ORDER — CYCLOBENZAPRINE HCL 10 MG
10 TABLET ORAL 3 TIMES DAILY PRN
Qty: 15 TABLET | Refills: 0 | Status: SHIPPED | OUTPATIENT
Start: 2020-11-03 | End: 2021-02-08

## 2020-11-03 RX ADMIN — HYDROMORPHONE HYDROCHLORIDE 1 MG: 1 INJECTION, SOLUTION INTRAMUSCULAR; INTRAVENOUS; SUBCUTANEOUS at 17:33

## 2020-11-03 RX ADMIN — ONDANSETRON 4 MG: 4 TABLET, ORALLY DISINTEGRATING ORAL at 17:33

## 2020-11-13 ENCOUNTER — TELEPHONE (OUTPATIENT)
Dept: INTERNAL MEDICINE | Facility: CLINIC | Age: 35
End: 2020-11-13

## 2020-11-18 LAB
LAB AP CASE REPORT: NORMAL
LAB AP GYN ADDITIONAL INFORMATION: NORMAL
Lab: NORMAL

## 2020-11-25 ENCOUNTER — OFFICE VISIT (OUTPATIENT)
Dept: OBSTETRICS AND GYNECOLOGY | Facility: CLINIC | Age: 35
End: 2020-11-25

## 2020-11-25 VITALS
WEIGHT: 186 LBS | SYSTOLIC BLOOD PRESSURE: 112 MMHG | DIASTOLIC BLOOD PRESSURE: 76 MMHG | BODY MASS INDEX: 32.96 KG/M2 | HEIGHT: 63 IN

## 2020-11-25 DIAGNOSIS — Z01.419 WELL WOMAN EXAM WITH ROUTINE GYNECOLOGICAL EXAM: Primary | ICD-10-CM

## 2020-11-25 DIAGNOSIS — E55.9 VITAMIN D DEFICIENCY: ICD-10-CM

## 2020-11-25 DIAGNOSIS — Z12.4 CERVICAL CANCER SCREENING: ICD-10-CM

## 2020-11-25 PROCEDURE — 87624 HPV HI-RISK TYP POOLED RSLT: CPT | Performed by: NURSE PRACTITIONER

## 2020-11-25 PROCEDURE — 87625 HPV TYPES 16 & 18 ONLY: CPT | Performed by: NURSE PRACTITIONER

## 2020-11-25 PROCEDURE — G0123 SCREEN CERV/VAG THIN LAYER: HCPCS | Performed by: NURSE PRACTITIONER

## 2020-11-25 PROCEDURE — 99395 PREV VISIT EST AGE 18-39: CPT | Performed by: NURSE PRACTITIONER

## 2020-11-25 RX ORDER — LURASIDONE HYDROCHLORIDE 20 MG/1
20 TABLET, FILM COATED ORAL DAILY
COMMUNITY
Start: 2020-11-14 | End: 2022-12-14

## 2020-11-25 RX ORDER — CHOLECALCIFEROL (VITAMIN D3) 125 MCG
5000 CAPSULE ORAL DAILY
Qty: 90 CAPSULE | Refills: 3 | Status: CANCELLED | OUTPATIENT
Start: 2020-11-25

## 2020-11-27 ENCOUNTER — PATIENT MESSAGE (OUTPATIENT)
Dept: OBSTETRICS AND GYNECOLOGY | Facility: CLINIC | Age: 35
End: 2020-11-27

## 2020-11-30 ENCOUNTER — TELEPHONE (OUTPATIENT)
Dept: OBSTETRICS AND GYNECOLOGY | Facility: CLINIC | Age: 35
End: 2020-11-30

## 2020-12-03 LAB
GEN CATEG CVX/VAG CYTO-IMP: ABNORMAL
HPV I/H RISK 4 DNA CVX QL PROBE+SIG AMP: DETECTED
HPV16 DNA SPEC QL NAA+PROBE: NOT DETECTED
HPV18+45 E6+E7 MRNA CVX QL NAA+PROBE: NOT DETECTED
LAB AP CASE REPORT: ABNORMAL
LAB AP GYN ADDITIONAL INFORMATION: ABNORMAL
LAB AP GYN OTHER FINDINGS: ABNORMAL
PATH INTERP SPEC-IMP: ABNORMAL
STAT OF ADQ CVX/VAG CYTO-IMP: ABNORMAL

## 2020-12-16 ENCOUNTER — TELEPHONE (OUTPATIENT)
Dept: OBSTETRICS AND GYNECOLOGY | Facility: CLINIC | Age: 35
End: 2020-12-16

## 2020-12-17 RX ORDER — ERGOCALCIFEROL 1.25 MG/1
CAPSULE ORAL
Qty: 4 CAPSULE | Refills: 2 | OUTPATIENT
Start: 2020-12-17

## 2020-12-17 NOTE — TELEPHONE ENCOUNTER
Received medication request from [facility] for medication refill of VItamin D 50318 units, patient is no longer under the care of Dr. Mark Marquez

## 2021-01-19 ENCOUNTER — OFFICE VISIT (OUTPATIENT)
Dept: OBSTETRICS AND GYNECOLOGY | Facility: CLINIC | Age: 36
End: 2021-01-19

## 2021-01-19 VITALS
WEIGHT: 186 LBS | HEIGHT: 63 IN | BODY MASS INDEX: 32.96 KG/M2 | DIASTOLIC BLOOD PRESSURE: 76 MMHG | SYSTOLIC BLOOD PRESSURE: 118 MMHG

## 2021-01-19 DIAGNOSIS — R87.610 ATYPICAL SQUAMOUS CELLS OF UNDETERMINED SIGNIFICANCE (ASCUS) ON PAPANICOLAOU SMEAR OF CERVIX: Primary | ICD-10-CM

## 2021-01-19 DIAGNOSIS — R87.820 CERVICAL LOW RISK HUMAN PAPILLOMAVIRUS (HPV) DNA TEST POSITIVE: ICD-10-CM

## 2021-01-19 PROCEDURE — 81025 URINE PREGNANCY TEST: CPT | Performed by: NURSE PRACTITIONER

## 2021-01-19 PROCEDURE — 88305 TISSUE EXAM BY PATHOLOGIST: CPT | Performed by: NURSE PRACTITIONER

## 2021-01-19 PROCEDURE — 57454 BX/CURETT OF CERVIX W/SCOPE: CPT | Performed by: NURSE PRACTITIONER

## 2021-01-19 RX ORDER — CHOLECALCIFEROL (VITAMIN D3) 125 MCG
1 TABLET ORAL DAILY
COMMUNITY
Start: 2020-11-27 | End: 2021-02-08

## 2021-01-21 PROBLEM — N87.0 CIN I (CERVICAL INTRAEPITHELIAL NEOPLASIA I): Status: ACTIVE | Noted: 2021-01-21

## 2021-01-21 LAB
LAB AP CASE REPORT: NORMAL
LAB AP CASE REPORT: NORMAL
LAB AP CLINICAL INFORMATION: NORMAL
LAB AP CLINICAL INFORMATION: NORMAL
PATH REPORT.FINAL DX SPEC: NORMAL
PATH REPORT.FINAL DX SPEC: NORMAL
PATH REPORT.GROSS SPEC: NORMAL
PATH REPORT.GROSS SPEC: NORMAL

## 2021-01-28 ENCOUNTER — OFFICE VISIT (OUTPATIENT)
Dept: OBSTETRICS AND GYNECOLOGY | Facility: CLINIC | Age: 36
End: 2021-01-28

## 2021-01-28 VITALS
SYSTOLIC BLOOD PRESSURE: 118 MMHG | DIASTOLIC BLOOD PRESSURE: 68 MMHG | BODY MASS INDEX: 32.96 KG/M2 | HEIGHT: 63 IN | WEIGHT: 186 LBS

## 2021-01-28 DIAGNOSIS — R10.2 PELVIC PAIN: Primary | ICD-10-CM

## 2021-01-28 PROCEDURE — 99213 OFFICE O/P EST LOW 20 MIN: CPT | Performed by: NURSE PRACTITIONER

## 2021-02-08 ENCOUNTER — OFFICE VISIT (OUTPATIENT)
Dept: OBSTETRICS AND GYNECOLOGY | Facility: CLINIC | Age: 36
End: 2021-02-08

## 2021-02-08 VITALS
HEIGHT: 63 IN | SYSTOLIC BLOOD PRESSURE: 104 MMHG | DIASTOLIC BLOOD PRESSURE: 64 MMHG | BODY MASS INDEX: 32.96 KG/M2 | WEIGHT: 186 LBS

## 2021-02-08 DIAGNOSIS — N94.10 FEMALE DYSPAREUNIA: Primary | ICD-10-CM

## 2021-02-08 DIAGNOSIS — N94.6 DYSMENORRHEA: ICD-10-CM

## 2021-02-08 PROCEDURE — 99214 OFFICE O/P EST MOD 30 MIN: CPT | Performed by: OBSTETRICS & GYNECOLOGY

## 2021-02-08 RX ORDER — SODIUM CHLORIDE 0.9 % (FLUSH) 0.9 %
1-10 SYRINGE (ML) INJECTION AS NEEDED
Status: CANCELLED | OUTPATIENT
Start: 2021-02-08

## 2021-02-08 RX ORDER — SODIUM CHLORIDE 9 MG/ML
100 INJECTION, SOLUTION INTRAVENOUS CONTINUOUS
Status: CANCELLED | OUTPATIENT
Start: 2021-02-08

## 2021-02-08 RX ORDER — SODIUM CHLORIDE 0.9 % (FLUSH) 0.9 %
3 SYRINGE (ML) INJECTION EVERY 12 HOURS SCHEDULED
Status: CANCELLED | OUTPATIENT
Start: 2021-02-08

## 2021-02-16 ENCOUNTER — TRANSCRIBE ORDERS (OUTPATIENT)
Dept: ADMINISTRATIVE | Facility: HOSPITAL | Age: 36
End: 2021-02-16

## 2021-02-16 DIAGNOSIS — Z01.818 PREOP TESTING: Primary | ICD-10-CM

## 2021-02-17 ENCOUNTER — APPOINTMENT (OUTPATIENT)
Dept: PREADMISSION TESTING | Facility: HOSPITAL | Age: 36
End: 2021-02-17

## 2021-02-22 ENCOUNTER — PRE-ADMISSION TESTING (OUTPATIENT)
Dept: PREADMISSION TESTING | Facility: HOSPITAL | Age: 36
End: 2021-02-22

## 2021-02-22 ENCOUNTER — LAB (OUTPATIENT)
Dept: LAB | Facility: HOSPITAL | Age: 36
End: 2021-02-22

## 2021-02-22 VITALS
RESPIRATION RATE: 16 BRPM | DIASTOLIC BLOOD PRESSURE: 61 MMHG | BODY MASS INDEX: 32.36 KG/M2 | HEART RATE: 88 BPM | OXYGEN SATURATION: 98 % | SYSTOLIC BLOOD PRESSURE: 130 MMHG | HEIGHT: 65 IN | WEIGHT: 194.22 LBS

## 2021-02-22 LAB — SARS-COV-2 ORF1AB RESP QL NAA+PROBE: NOT DETECTED

## 2021-02-22 PROCEDURE — 80048 BASIC METABOLIC PNL TOTAL CA: CPT | Performed by: OBSTETRICS & GYNECOLOGY

## 2021-02-22 PROCEDURE — 85027 COMPLETE CBC AUTOMATED: CPT | Performed by: OBSTETRICS & GYNECOLOGY

## 2021-02-22 PROCEDURE — U0004 COV-19 TEST NON-CDC HGH THRU: HCPCS | Performed by: OBSTETRICS & GYNECOLOGY

## 2021-02-22 PROCEDURE — C9803 HOPD COVID-19 SPEC COLLECT: HCPCS | Performed by: OBSTETRICS & GYNECOLOGY

## 2021-02-23 ENCOUNTER — ANESTHESIA EVENT (OUTPATIENT)
Dept: PERIOP | Facility: HOSPITAL | Age: 36
End: 2021-02-23

## 2021-02-24 ENCOUNTER — ANESTHESIA (OUTPATIENT)
Dept: PERIOP | Facility: HOSPITAL | Age: 36
End: 2021-02-24

## 2021-02-24 ENCOUNTER — HOSPITAL ENCOUNTER (OUTPATIENT)
Facility: HOSPITAL | Age: 36
Setting detail: HOSPITAL OUTPATIENT SURGERY
Discharge: HOME | End: 2021-02-24
Attending: OBSTETRICS & GYNECOLOGY | Admitting: OBSTETRICS & GYNECOLOGY

## 2021-02-24 VITALS
RESPIRATION RATE: 18 BRPM | HEART RATE: 72 BPM | TEMPERATURE: 97.3 F | SYSTOLIC BLOOD PRESSURE: 109 MMHG | DIASTOLIC BLOOD PRESSURE: 58 MMHG | OXYGEN SATURATION: 98 %

## 2021-02-24 DIAGNOSIS — N94.6 DYSMENORRHEA: ICD-10-CM

## 2021-02-24 DIAGNOSIS — N94.10 FEMALE DYSPAREUNIA: ICD-10-CM

## 2021-02-24 LAB — B-HCG UR QL: NEGATIVE

## 2021-02-24 PROCEDURE — 25010000002 MIDAZOLAM PER 1 MG: Performed by: ANESTHESIOLOGY

## 2021-02-24 PROCEDURE — 88305 TISSUE EXAM BY PATHOLOGIST: CPT | Performed by: OBSTETRICS & GYNECOLOGY

## 2021-02-24 PROCEDURE — 49321 LAPAROSCOPY BIOPSY: CPT | Performed by: OBSTETRICS & GYNECOLOGY

## 2021-02-24 PROCEDURE — 25010000002 ONDANSETRON PER 1 MG: Performed by: NURSE ANESTHETIST, CERTIFIED REGISTERED

## 2021-02-24 PROCEDURE — 25010000002 KETOROLAC TROMETHAMINE PER 15 MG: Performed by: NURSE ANESTHETIST, CERTIFIED REGISTERED

## 2021-02-24 PROCEDURE — 25010000002 PROPOFOL 10 MG/ML EMULSION: Performed by: NURSE ANESTHETIST, CERTIFIED REGISTERED

## 2021-02-24 PROCEDURE — 25010000002 PHENYLEPHRINE HCL 0.8 MG/10ML SOLUTION PREFILLED SYRINGE: Performed by: NURSE ANESTHETIST, CERTIFIED REGISTERED

## 2021-02-24 PROCEDURE — 25010000002 FENTANYL CITRATE (PF) 250 MCG/5ML SOLUTION: Performed by: NURSE ANESTHETIST, CERTIFIED REGISTERED

## 2021-02-24 PROCEDURE — 25010000002 DEXAMETHASONE PER 1 MG: Performed by: ANESTHESIOLOGY

## 2021-02-24 PROCEDURE — 25010000002 ONDANSETRON PER 1 MG: Performed by: ANESTHESIOLOGY

## 2021-02-24 PROCEDURE — 81025 URINE PREGNANCY TEST: CPT | Performed by: OBSTETRICS & GYNECOLOGY

## 2021-02-24 PROCEDURE — 25010000002 DEXAMETHASONE PER 1 MG: Performed by: NURSE ANESTHETIST, CERTIFIED REGISTERED

## 2021-02-24 RX ORDER — HYDROCODONE BITARTRATE AND ACETAMINOPHEN 7.5; 325 MG/1; MG/1
1 TABLET ORAL ONCE AS NEEDED
Status: DISCONTINUED | OUTPATIENT
Start: 2021-02-24 | End: 2021-02-24 | Stop reason: HOSPADM

## 2021-02-24 RX ORDER — DEXAMETHASONE SODIUM PHOSPHATE 4 MG/ML
INJECTION, SOLUTION INTRA-ARTICULAR; INTRALESIONAL; INTRAMUSCULAR; INTRAVENOUS; SOFT TISSUE AS NEEDED
Status: DISCONTINUED | OUTPATIENT
Start: 2021-02-24 | End: 2021-02-24 | Stop reason: SURG

## 2021-02-24 RX ORDER — DEXAMETHASONE SODIUM PHOSPHATE 4 MG/ML
4 INJECTION, SOLUTION INTRA-ARTICULAR; INTRALESIONAL; INTRAMUSCULAR; INTRAVENOUS; SOFT TISSUE ONCE AS NEEDED
Status: COMPLETED | OUTPATIENT
Start: 2021-02-24 | End: 2021-02-24

## 2021-02-24 RX ORDER — HYDROCODONE BITARTRATE AND ACETAMINOPHEN 7.5; 325 MG/1; MG/1
1 TABLET ORAL EVERY 4 HOURS PRN
Qty: 8 TABLET | Refills: 0 | Status: SHIPPED | OUTPATIENT
Start: 2021-02-24 | End: 2021-03-11

## 2021-02-24 RX ORDER — SODIUM CHLORIDE 0.9 % (FLUSH) 0.9 %
3 SYRINGE (ML) INJECTION EVERY 12 HOURS SCHEDULED
Status: DISCONTINUED | OUTPATIENT
Start: 2021-02-24 | End: 2021-02-24 | Stop reason: HOSPADM

## 2021-02-24 RX ORDER — MAGNESIUM HYDROXIDE 1200 MG/15ML
LIQUID ORAL AS NEEDED
Status: DISCONTINUED | OUTPATIENT
Start: 2021-02-24 | End: 2021-02-24 | Stop reason: HOSPADM

## 2021-02-24 RX ORDER — FENTANYL CITRATE 50 UG/ML
INJECTION, SOLUTION INTRAMUSCULAR; INTRAVENOUS AS NEEDED
Status: DISCONTINUED | OUTPATIENT
Start: 2021-02-24 | End: 2021-02-24 | Stop reason: SURG

## 2021-02-24 RX ORDER — SODIUM CHLORIDE, SODIUM LACTATE, POTASSIUM CHLORIDE, CALCIUM CHLORIDE 600; 310; 30; 20 MG/100ML; MG/100ML; MG/100ML; MG/100ML
1000 INJECTION, SOLUTION INTRAVENOUS CONTINUOUS
Status: DISCONTINUED | OUTPATIENT
Start: 2021-02-24 | End: 2021-02-24 | Stop reason: HOSPADM

## 2021-02-24 RX ORDER — PROPOFOL 10 MG/ML
VIAL (ML) INTRAVENOUS AS NEEDED
Status: DISCONTINUED | OUTPATIENT
Start: 2021-02-24 | End: 2021-02-24 | Stop reason: SURG

## 2021-02-24 RX ORDER — SODIUM CHLORIDE, SODIUM LACTATE, POTASSIUM CHLORIDE, CALCIUM CHLORIDE 600; 310; 30; 20 MG/100ML; MG/100ML; MG/100ML; MG/100ML
100 INJECTION, SOLUTION INTRAVENOUS CONTINUOUS
Status: DISCONTINUED | OUTPATIENT
Start: 2021-02-24 | End: 2021-02-24 | Stop reason: HOSPADM

## 2021-02-24 RX ORDER — SODIUM CHLORIDE 0.9 % (FLUSH) 0.9 %
1-10 SYRINGE (ML) INJECTION AS NEEDED
Status: DISCONTINUED | OUTPATIENT
Start: 2021-02-24 | End: 2021-02-24 | Stop reason: HOSPADM

## 2021-02-24 RX ORDER — ONDANSETRON 2 MG/ML
4 INJECTION INTRAMUSCULAR; INTRAVENOUS ONCE AS NEEDED
Status: COMPLETED | OUTPATIENT
Start: 2021-02-24 | End: 2021-02-24

## 2021-02-24 RX ORDER — LIDOCAINE HYDROCHLORIDE 20 MG/ML
INJECTION, SOLUTION EPIDURAL; INFILTRATION; INTRACAUDAL; PERINEURAL AS NEEDED
Status: DISCONTINUED | OUTPATIENT
Start: 2021-02-24 | End: 2021-02-24 | Stop reason: SURG

## 2021-02-24 RX ORDER — BUPIVACAINE HYDROCHLORIDE 2.5 MG/ML
INJECTION, SOLUTION INFILTRATION; PERINEURAL AS NEEDED
Status: DISCONTINUED | OUTPATIENT
Start: 2021-02-24 | End: 2021-02-24 | Stop reason: HOSPADM

## 2021-02-24 RX ORDER — ONDANSETRON 2 MG/ML
4 INJECTION INTRAMUSCULAR; INTRAVENOUS ONCE AS NEEDED
Status: DISCONTINUED | OUTPATIENT
Start: 2021-02-24 | End: 2021-02-24 | Stop reason: HOSPADM

## 2021-02-24 RX ORDER — NEOSTIGMINE METHYLSULFATE 5 MG/5 ML
SYRINGE (ML) INTRAVENOUS AS NEEDED
Status: DISCONTINUED | OUTPATIENT
Start: 2021-02-24 | End: 2021-02-24 | Stop reason: SURG

## 2021-02-24 RX ORDER — NALOXONE HCL 0.4 MG/ML
0.4 VIAL (ML) INJECTION AS NEEDED
Status: DISCONTINUED | OUTPATIENT
Start: 2021-02-24 | End: 2021-02-24 | Stop reason: HOSPADM

## 2021-02-24 RX ORDER — SODIUM CHLORIDE 0.9 % (FLUSH) 0.9 %
3-10 SYRINGE (ML) INJECTION AS NEEDED
Status: DISCONTINUED | OUTPATIENT
Start: 2021-02-24 | End: 2021-02-24 | Stop reason: HOSPADM

## 2021-02-24 RX ORDER — FLUMAZENIL 0.1 MG/ML
0.2 INJECTION INTRAVENOUS AS NEEDED
Status: DISCONTINUED | OUTPATIENT
Start: 2021-02-24 | End: 2021-02-24 | Stop reason: HOSPADM

## 2021-02-24 RX ORDER — ACETAMINOPHEN 500 MG
1000 TABLET ORAL ONCE
Status: COMPLETED | OUTPATIENT
Start: 2021-02-24 | End: 2021-02-24

## 2021-02-24 RX ORDER — ONDANSETRON 2 MG/ML
INJECTION INTRAMUSCULAR; INTRAVENOUS AS NEEDED
Status: DISCONTINUED | OUTPATIENT
Start: 2021-02-24 | End: 2021-02-24 | Stop reason: SURG

## 2021-02-24 RX ORDER — MIDAZOLAM HYDROCHLORIDE 1 MG/ML
1 INJECTION INTRAMUSCULAR; INTRAVENOUS
Status: DISCONTINUED | OUTPATIENT
Start: 2021-02-24 | End: 2021-02-24 | Stop reason: HOSPADM

## 2021-02-24 RX ORDER — FAMOTIDINE 10 MG/ML
20 INJECTION, SOLUTION INTRAVENOUS
Status: COMPLETED | OUTPATIENT
Start: 2021-02-24 | End: 2021-02-24

## 2021-02-24 RX ORDER — KETOROLAC TROMETHAMINE 30 MG/ML
INJECTION, SOLUTION INTRAMUSCULAR; INTRAVENOUS AS NEEDED
Status: DISCONTINUED | OUTPATIENT
Start: 2021-02-24 | End: 2021-02-24 | Stop reason: SURG

## 2021-02-24 RX ORDER — LIDOCAINE HYDROCHLORIDE 40 MG/ML
SOLUTION TOPICAL AS NEEDED
Status: DISCONTINUED | OUTPATIENT
Start: 2021-02-24 | End: 2021-02-24 | Stop reason: SURG

## 2021-02-24 RX ORDER — PHENYLEPHRINE HCL IN 0.9% NACL 0.8MG/10ML
SYRINGE (ML) INTRAVENOUS AS NEEDED
Status: DISCONTINUED | OUTPATIENT
Start: 2021-02-24 | End: 2021-02-24 | Stop reason: SURG

## 2021-02-24 RX ORDER — OXYCODONE AND ACETAMINOPHEN 7.5; 325 MG/1; MG/1
2 TABLET ORAL EVERY 4 HOURS PRN
Status: DISCONTINUED | OUTPATIENT
Start: 2021-02-24 | End: 2021-02-24 | Stop reason: HOSPADM

## 2021-02-24 RX ORDER — LIDOCAINE HYDROCHLORIDE 10 MG/ML
0.5 INJECTION, SOLUTION EPIDURAL; INFILTRATION; INTRACAUDAL; PERINEURAL ONCE AS NEEDED
Status: DISCONTINUED | OUTPATIENT
Start: 2021-02-24 | End: 2021-02-24 | Stop reason: HOSPADM

## 2021-02-24 RX ORDER — ROCURONIUM BROMIDE 10 MG/ML
INJECTION, SOLUTION INTRAVENOUS AS NEEDED
Status: DISCONTINUED | OUTPATIENT
Start: 2021-02-24 | End: 2021-02-24 | Stop reason: SURG

## 2021-02-24 RX ORDER — OXYCODONE AND ACETAMINOPHEN 10; 325 MG/1; MG/1
1 TABLET ORAL ONCE AS NEEDED
Status: DISCONTINUED | OUTPATIENT
Start: 2021-02-24 | End: 2021-02-24 | Stop reason: HOSPADM

## 2021-02-24 RX ORDER — FENTANYL CITRATE 50 UG/ML
25 INJECTION, SOLUTION INTRAMUSCULAR; INTRAVENOUS
Status: DISCONTINUED | OUTPATIENT
Start: 2021-02-24 | End: 2021-02-24 | Stop reason: HOSPADM

## 2021-02-24 RX ORDER — LABETALOL HYDROCHLORIDE 5 MG/ML
5 INJECTION, SOLUTION INTRAVENOUS
Status: DISCONTINUED | OUTPATIENT
Start: 2021-02-24 | End: 2021-02-24 | Stop reason: HOSPADM

## 2021-02-24 RX ORDER — MIDAZOLAM HYDROCHLORIDE 1 MG/ML
2 INJECTION INTRAMUSCULAR; INTRAVENOUS
Status: DISCONTINUED | OUTPATIENT
Start: 2021-02-24 | End: 2021-02-24 | Stop reason: HOSPADM

## 2021-02-24 RX ORDER — SODIUM CHLORIDE 9 MG/ML
100 INJECTION, SOLUTION INTRAVENOUS CONTINUOUS
Status: DISCONTINUED | OUTPATIENT
Start: 2021-02-24 | End: 2021-02-24 | Stop reason: HOSPADM

## 2021-02-24 RX ORDER — IBUPROFEN 600 MG/1
600 TABLET ORAL ONCE AS NEEDED
Status: DISCONTINUED | OUTPATIENT
Start: 2021-02-24 | End: 2021-02-24 | Stop reason: HOSPADM

## 2021-02-24 RX ADMIN — ONDANSETRON HYDROCHLORIDE 4 MG: 2 SOLUTION INTRAMUSCULAR; INTRAVENOUS at 10:53

## 2021-02-24 RX ADMIN — LIDOCAINE HYDROCHLORIDE 100 MG: 20 INJECTION, SOLUTION EPIDURAL; INFILTRATION; INTRACAUDAL; PERINEURAL at 10:27

## 2021-02-24 RX ADMIN — OXYCODONE HYDROCHLORIDE AND ACETAMINOPHEN 2 TABLET: 7.5; 325 TABLET ORAL at 11:26

## 2021-02-24 RX ADMIN — DEXAMETHASONE SODIUM PHOSPHATE 4 MG: 4 INJECTION, SOLUTION INTRAMUSCULAR; INTRAVENOUS at 10:04

## 2021-02-24 RX ADMIN — FENTANYL CITRATE 50 MCG: 50 INJECTION, SOLUTION INTRAMUSCULAR; INTRAVENOUS at 10:38

## 2021-02-24 RX ADMIN — SODIUM CHLORIDE, POTASSIUM CHLORIDE, SODIUM LACTATE AND CALCIUM CHLORIDE 1000 ML: 600; 310; 30; 20 INJECTION, SOLUTION INTRAVENOUS at 09:55

## 2021-02-24 RX ADMIN — FENTANYL CITRATE 100 MCG: 50 INJECTION, SOLUTION INTRAMUSCULAR; INTRAVENOUS at 10:24

## 2021-02-24 RX ADMIN — FENTANYL CITRATE 50 MCG: 50 INJECTION, SOLUTION INTRAMUSCULAR; INTRAVENOUS at 10:44

## 2021-02-24 RX ADMIN — Medication 3 MG: at 10:56

## 2021-02-24 RX ADMIN — FAMOTIDINE 20 MG: 10 INJECTION INTRAVENOUS at 10:04

## 2021-02-24 RX ADMIN — ROCURONIUM BROMIDE 10 MG: 10 INJECTION INTRAVENOUS at 10:27

## 2021-02-24 RX ADMIN — DEXAMETHASONE SODIUM PHOSPHATE 4 MG: 4 INJECTION, SOLUTION INTRAMUSCULAR; INTRAVENOUS at 10:34

## 2021-02-24 RX ADMIN — ACETAMINOPHEN 1000 MG: 500 TABLET, FILM COATED ORAL at 10:04

## 2021-02-24 RX ADMIN — Medication 120 MCG: at 10:33

## 2021-02-24 RX ADMIN — KETOROLAC TROMETHAMINE 30 MG: 30 INJECTION, SOLUTION INTRAMUSCULAR at 10:55

## 2021-02-24 RX ADMIN — LIDOCAINE HYDROCHLORIDE 1 EACH: 40 SOLUTION TOPICAL at 10:28

## 2021-02-24 RX ADMIN — ROCURONIUM BROMIDE 15 MG: 10 INJECTION INTRAVENOUS at 10:28

## 2021-02-24 RX ADMIN — ONDANSETRON HYDROCHLORIDE 4 MG: 2 SOLUTION INTRAMUSCULAR; INTRAVENOUS at 11:26

## 2021-02-24 RX ADMIN — PROPOFOL 200 MG: 10 INJECTION, EMULSION INTRAVENOUS at 10:27

## 2021-02-24 RX ADMIN — Medication 120 MCG: at 10:30

## 2021-02-24 RX ADMIN — GLYCOPYRROLATE 0.4 MG: 0.2 INJECTION, SOLUTION INTRAMUSCULAR; INTRAVENOUS at 10:56

## 2021-02-24 RX ADMIN — FENTANYL CITRATE 50 MCG: 50 INJECTION, SOLUTION INTRAMUSCULAR; INTRAVENOUS at 10:27

## 2021-02-24 RX ADMIN — MIDAZOLAM 2 MG: 1 INJECTION INTRAMUSCULAR; INTRAVENOUS at 10:04

## 2021-02-26 LAB
CYTO UR: NORMAL
LAB AP CASE REPORT: NORMAL
PATH REPORT.FINAL DX SPEC: NORMAL
PATH REPORT.GROSS SPEC: NORMAL

## 2021-03-11 ENCOUNTER — OFFICE VISIT (OUTPATIENT)
Dept: OBSTETRICS AND GYNECOLOGY | Facility: CLINIC | Age: 36
End: 2021-03-11

## 2021-03-11 VITALS
HEIGHT: 63 IN | SYSTOLIC BLOOD PRESSURE: 104 MMHG | WEIGHT: 192 LBS | BODY MASS INDEX: 34.02 KG/M2 | DIASTOLIC BLOOD PRESSURE: 70 MMHG

## 2021-03-11 DIAGNOSIS — N94.10 FEMALE DYSPAREUNIA: Primary | ICD-10-CM

## 2021-03-11 DIAGNOSIS — N94.6 DYSMENORRHEA: ICD-10-CM

## 2021-03-11 DIAGNOSIS — Z09 POSTOP CHECK: ICD-10-CM

## 2021-03-11 PROCEDURE — 99024 POSTOP FOLLOW-UP VISIT: CPT | Performed by: OBSTETRICS & GYNECOLOGY

## 2021-03-11 RX ORDER — DIVALPROEX SODIUM 500 MG/1
500 TABLET, DELAYED RELEASE ORAL 3 TIMES DAILY
COMMUNITY
End: 2022-12-14

## 2021-04-15 ENCOUNTER — OFFICE VISIT (OUTPATIENT)
Dept: INTERNAL MEDICINE | Facility: CLINIC | Age: 36
End: 2021-04-15

## 2021-04-15 VITALS
BODY MASS INDEX: 35.28 KG/M2 | DIASTOLIC BLOOD PRESSURE: 74 MMHG | TEMPERATURE: 98.1 F | SYSTOLIC BLOOD PRESSURE: 110 MMHG | RESPIRATION RATE: 16 BRPM | WEIGHT: 199.1 LBS | HEART RATE: 88 BPM | HEIGHT: 63 IN | OXYGEN SATURATION: 98 %

## 2021-04-15 DIAGNOSIS — M79.89 LEFT LEG SWELLING: Primary | ICD-10-CM

## 2021-04-15 DIAGNOSIS — L03.116 LEFT LEG CELLULITIS: ICD-10-CM

## 2021-04-15 PROBLEM — F25.0 SCHIZOAFFECTIVE DISORDER, BIPOLAR TYPE (HCC): Status: ACTIVE | Noted: 2020-08-17

## 2021-04-15 PROCEDURE — 99214 OFFICE O/P EST MOD 30 MIN: CPT | Performed by: INTERNAL MEDICINE

## 2021-04-15 RX ORDER — TRAZODONE HYDROCHLORIDE 150 MG/1
150 TABLET ORAL NIGHTLY PRN
Qty: 30 TABLET | Refills: 3 | Status: CANCELLED | OUTPATIENT
Start: 2021-04-15

## 2021-04-15 RX ORDER — HYDROCODONE BITARTRATE AND ACETAMINOPHEN 5; 325 MG/1; MG/1
1 TABLET ORAL 2 TIMES DAILY PRN
Qty: 10 TABLET | Refills: 0 | Status: SHIPPED | OUTPATIENT
Start: 2021-04-15 | End: 2021-08-06

## 2021-04-15 RX ORDER — SULFAMETHOXAZOLE AND TRIMETHOPRIM 800; 160 MG/1; MG/1
1 TABLET ORAL 2 TIMES DAILY
Qty: 14 TABLET | Refills: 0 | Status: SHIPPED | OUTPATIENT
Start: 2021-04-15 | End: 2021-04-22

## 2021-04-16 ENCOUNTER — HOSPITAL ENCOUNTER (OUTPATIENT)
Dept: ULTRASOUND IMAGING | Facility: HOSPITAL | Age: 36
Discharge: HOME | End: 2021-04-16

## 2021-04-16 ENCOUNTER — TELEPHONE (OUTPATIENT)
Dept: INTERNAL MEDICINE | Facility: CLINIC | Age: 36
End: 2021-04-16

## 2021-04-16 ENCOUNTER — HOSPITAL ENCOUNTER (OUTPATIENT)
Dept: GENERAL RADIOLOGY | Facility: HOSPITAL | Age: 36
Discharge: HOME | End: 2021-04-16

## 2021-04-16 DIAGNOSIS — L03.116 LEFT LEG CELLULITIS: ICD-10-CM

## 2021-04-16 DIAGNOSIS — M79.89 LEFT LEG SWELLING: ICD-10-CM

## 2021-04-16 PROCEDURE — 73610 X-RAY EXAM OF ANKLE: CPT

## 2021-04-16 PROCEDURE — 93971 EXTREMITY STUDY: CPT

## 2021-06-21 ENCOUNTER — TRANSCRIBE ORDERS (OUTPATIENT)
Dept: ADMINISTRATIVE | Facility: HOSPITAL | Age: 36
End: 2021-06-21

## 2021-06-21 ENCOUNTER — LAB (OUTPATIENT)
Dept: LAB | Facility: HOSPITAL | Age: 36
End: 2021-06-21

## 2021-06-21 DIAGNOSIS — Z79.899 ENCOUNTER FOR LONG-TERM (CURRENT) USE OF OTHER MEDICATIONS: Primary | ICD-10-CM

## 2021-06-21 DIAGNOSIS — Z79.899 ENCOUNTER FOR LONG-TERM (CURRENT) USE OF OTHER MEDICATIONS: ICD-10-CM

## 2021-06-21 LAB
CHOLEST SERPL-MCNC: 225 MG/DL (ref 0–200)
GLUCOSE P FAST SERPL-MCNC: 89 MG/DL (ref 74–106)
HBA1C MFR BLD: 5.3 % (ref 4.8–5.6)
HDLC SERPL-MCNC: 36 MG/DL (ref 40–60)
LDLC SERPL CALC-MCNC: 168 MG/DL (ref 0–100)
LDLC/HDLC SERPL: 4.62 {RATIO}
TRIGL SERPL-MCNC: 113 MG/DL (ref 0–150)
VALPROATE SERPL-MCNC: 62.9 MCG/ML (ref 50–125)
VLDLC SERPL-MCNC: 21 MG/DL (ref 5–40)

## 2021-06-21 PROCEDURE — 83036 HEMOGLOBIN GLYCOSYLATED A1C: CPT

## 2021-06-21 PROCEDURE — 84146 ASSAY OF PROLACTIN: CPT

## 2021-06-21 PROCEDURE — 82947 ASSAY GLUCOSE BLOOD QUANT: CPT

## 2021-06-21 PROCEDURE — 36415 COLL VENOUS BLD VENIPUNCTURE: CPT

## 2021-06-21 PROCEDURE — 80061 LIPID PANEL: CPT

## 2021-06-21 PROCEDURE — 80164 ASSAY DIPROPYLACETIC ACD TOT: CPT

## 2021-06-22 LAB — PROLACTIN SERPL-MCNC: 37 NG/ML (ref 4.79–23.3)

## 2021-07-26 ENCOUNTER — TELEPHONE (OUTPATIENT)
Dept: INTERNAL MEDICINE | Facility: CLINIC | Age: 36
End: 2021-07-26

## 2021-07-26 ENCOUNTER — HOSPITAL ENCOUNTER (OUTPATIENT)
Dept: ULTRASOUND IMAGING | Facility: HOSPITAL | Age: 36
Discharge: HOME | End: 2021-07-26
Admitting: NURSE PRACTITIONER

## 2021-07-26 ENCOUNTER — NURSE TRIAGE (OUTPATIENT)
Dept: CALL CENTER | Facility: HOSPITAL | Age: 36
End: 2021-07-26

## 2021-07-26 ENCOUNTER — OFFICE VISIT (OUTPATIENT)
Dept: FAMILY MEDICINE CLINIC | Facility: CLINIC | Age: 36
End: 2021-07-26

## 2021-07-26 VITALS
DIASTOLIC BLOOD PRESSURE: 82 MMHG | BODY MASS INDEX: 35.43 KG/M2 | HEART RATE: 84 BPM | RESPIRATION RATE: 20 BRPM | WEIGHT: 200 LBS | TEMPERATURE: 97.7 F | SYSTOLIC BLOOD PRESSURE: 129 MMHG

## 2021-07-26 DIAGNOSIS — M25.471 RIGHT ANKLE SWELLING: Primary | ICD-10-CM

## 2021-07-26 DIAGNOSIS — L24.9 IRRITANT CONTACT DERMATITIS, UNSPECIFIED TRIGGER: ICD-10-CM

## 2021-07-26 DIAGNOSIS — M25.471 RIGHT ANKLE SWELLING: ICD-10-CM

## 2021-07-26 DIAGNOSIS — Z76.5 DRUG-SEEKING BEHAVIOR: ICD-10-CM

## 2021-07-26 PROCEDURE — 99214 OFFICE O/P EST MOD 30 MIN: CPT | Performed by: NURSE PRACTITIONER

## 2021-07-26 PROCEDURE — 93971 EXTREMITY STUDY: CPT

## 2021-07-26 RX ORDER — METHYLPREDNISOLONE 4 MG/1
TABLET ORAL
Qty: 21 TABLET | Refills: 0 | Status: SHIPPED | OUTPATIENT
Start: 2021-07-26 | End: 2021-08-06

## 2021-07-27 ENCOUNTER — TELEPHONE (OUTPATIENT)
Dept: FAMILY MEDICINE CLINIC | Facility: CLINIC | Age: 36
End: 2021-07-27

## 2021-08-03 ENCOUNTER — TELEPHONE (OUTPATIENT)
Dept: INTERNAL MEDICINE | Facility: CLINIC | Age: 36
End: 2021-08-03

## 2021-08-03 ENCOUNTER — APPOINTMENT (OUTPATIENT)
Dept: GENERAL RADIOLOGY | Facility: HOSPITAL | Age: 36
End: 2021-08-03

## 2021-08-03 ENCOUNTER — HOSPITAL ENCOUNTER (EMERGENCY)
Facility: HOSPITAL | Age: 36
Discharge: HOME | End: 2021-08-03
Admitting: EMERGENCY MEDICINE

## 2021-08-03 VITALS
OXYGEN SATURATION: 99 % | DIASTOLIC BLOOD PRESSURE: 90 MMHG | TEMPERATURE: 97.9 F | WEIGHT: 200 LBS | HEART RATE: 88 BPM | HEIGHT: 65 IN | SYSTOLIC BLOOD PRESSURE: 148 MMHG | BODY MASS INDEX: 33.32 KG/M2 | RESPIRATION RATE: 19 BRPM

## 2021-08-03 DIAGNOSIS — M54.42 CHRONIC BILATERAL LOW BACK PAIN WITH LEFT-SIDED SCIATICA: Primary | ICD-10-CM

## 2021-08-03 DIAGNOSIS — G89.29 CHRONIC BILATERAL LOW BACK PAIN WITH LEFT-SIDED SCIATICA: Primary | ICD-10-CM

## 2021-08-03 PROCEDURE — 99283 EMERGENCY DEPT VISIT LOW MDM: CPT

## 2021-08-03 PROCEDURE — 72110 X-RAY EXAM L-2 SPINE 4/>VWS: CPT

## 2021-08-03 PROCEDURE — 96372 THER/PROPH/DIAG INJ SC/IM: CPT

## 2021-08-03 PROCEDURE — 25010000002 ORPHENADRINE CITRATE PER 60 MG: Performed by: PHYSICIAN ASSISTANT

## 2021-08-03 PROCEDURE — 25010000002 KETOROLAC TROMETHAMINE PER 15 MG: Performed by: PHYSICIAN ASSISTANT

## 2021-08-03 RX ORDER — ORPHENADRINE CITRATE 30 MG/ML
60 INJECTION INTRAMUSCULAR; INTRAVENOUS ONCE
Status: COMPLETED | OUTPATIENT
Start: 2021-08-03 | End: 2021-08-03

## 2021-08-03 RX ORDER — CYCLOBENZAPRINE HCL 10 MG
10 TABLET ORAL 3 TIMES DAILY PRN
Qty: 21 TABLET | Refills: 0 | Status: SHIPPED | OUTPATIENT
Start: 2021-08-03 | End: 2022-12-14

## 2021-08-03 RX ORDER — KETOROLAC TROMETHAMINE 30 MG/ML
60 INJECTION, SOLUTION INTRAMUSCULAR; INTRAVENOUS ONCE
Status: COMPLETED | OUTPATIENT
Start: 2021-08-03 | End: 2021-08-03

## 2021-08-03 RX ADMIN — KETOROLAC TROMETHAMINE 60 MG: 60 INJECTION, SOLUTION INTRAMUSCULAR at 11:26

## 2021-08-03 RX ADMIN — ORPHENADRINE CITRATE 60 MG: 60 INJECTION INTRAMUSCULAR; INTRAVENOUS at 11:25

## 2021-08-06 ENCOUNTER — OFFICE VISIT (OUTPATIENT)
Dept: INTERNAL MEDICINE | Facility: CLINIC | Age: 36
End: 2021-08-06

## 2021-08-06 VITALS
RESPIRATION RATE: 16 BRPM | BODY MASS INDEX: 33.74 KG/M2 | TEMPERATURE: 98.1 F | SYSTOLIC BLOOD PRESSURE: 132 MMHG | HEIGHT: 65 IN | OXYGEN SATURATION: 98 % | DIASTOLIC BLOOD PRESSURE: 70 MMHG | HEART RATE: 78 BPM | WEIGHT: 202.5 LBS

## 2021-08-06 DIAGNOSIS — S39.012A ACUTE MYOFASCIAL STRAIN OF LUMBAR REGION, INITIAL ENCOUNTER: Primary | ICD-10-CM

## 2021-08-06 PROCEDURE — 99213 OFFICE O/P EST LOW 20 MIN: CPT | Performed by: INTERNAL MEDICINE

## 2021-08-18 ENCOUNTER — LAB (OUTPATIENT)
Dept: LAB | Facility: HOSPITAL | Age: 36
End: 2021-08-18

## 2021-08-18 DIAGNOSIS — Z20.822 EXPOSURE TO COVID-19 VIRUS: ICD-10-CM

## 2021-08-18 DIAGNOSIS — Z20.822 EXPOSURE TO COVID-19 VIRUS: Primary | ICD-10-CM

## 2021-08-18 LAB — SARS-COV-2 RNA PNL SPEC NAA+PROBE: NOT DETECTED

## 2021-08-18 PROCEDURE — C9803 HOPD COVID-19 SPEC COLLECT: HCPCS

## 2021-08-18 PROCEDURE — 87635 SARS-COV-2 COVID-19 AMP PRB: CPT

## 2021-08-20 DIAGNOSIS — N39.46 MIXED INCONTINENCE: ICD-10-CM

## 2021-08-20 RX ORDER — OXYBUTYNIN CHLORIDE 10 MG/1
TABLET, EXTENDED RELEASE ORAL
Qty: 30 TABLET | Refills: 4 | Status: SHIPPED | OUTPATIENT
Start: 2021-08-20 | End: 2022-12-14

## 2021-08-25 ENCOUNTER — TELEPHONE (OUTPATIENT)
Dept: INTERNAL MEDICINE | Facility: CLINIC | Age: 36
End: 2021-08-25

## 2021-08-25 DIAGNOSIS — B85.0 HEAD LICE: Primary | ICD-10-CM

## 2021-08-25 RX ORDER — PERMETHRIN 1 %-0.25 %
1 COMBINATION PACKAGE (ML) MISCELLANEOUS ONCE
Qty: 1 KIT | Refills: 0 | Status: SHIPPED | OUTPATIENT
Start: 2021-08-25 | End: 2021-08-25

## 2021-08-26 ENCOUNTER — TELEPHONE (OUTPATIENT)
Dept: INTERNAL MEDICINE | Facility: CLINIC | Age: 36
End: 2021-08-26

## 2021-08-26 DIAGNOSIS — B85.0 HEAD LICE: Primary | ICD-10-CM

## 2021-08-26 RX ORDER — SPINOSAD 9 MG/ML
1 SUSPENSION TOPICAL DAILY
Qty: 120 ML | Refills: 0 | Status: SHIPPED | OUTPATIENT
Start: 2021-08-26 | End: 2022-12-14

## 2021-08-31 DIAGNOSIS — S39.012A ACUTE MYOFASCIAL STRAIN OF LUMBAR REGION, INITIAL ENCOUNTER: ICD-10-CM

## 2021-09-07 ENCOUNTER — OFFICE VISIT (OUTPATIENT)
Dept: NEUROSURGERY | Facility: CLINIC | Age: 36
End: 2021-09-07

## 2021-09-07 VITALS
WEIGHT: 204.8 LBS | SYSTOLIC BLOOD PRESSURE: 110 MMHG | HEIGHT: 63 IN | DIASTOLIC BLOOD PRESSURE: 72 MMHG | BODY MASS INDEX: 36.29 KG/M2

## 2021-09-07 DIAGNOSIS — M54.41 CHRONIC RIGHT-SIDED LOW BACK PAIN WITH RIGHT-SIDED SCIATICA: Primary | ICD-10-CM

## 2021-09-07 DIAGNOSIS — E66.09 CLASS 2 OBESITY DUE TO EXCESS CALORIES WITHOUT SERIOUS COMORBIDITY WITH BODY MASS INDEX (BMI) OF 36.0 TO 36.9 IN ADULT: ICD-10-CM

## 2021-09-07 DIAGNOSIS — G89.29 CHRONIC RIGHT-SIDED LOW BACK PAIN WITH RIGHT-SIDED SCIATICA: Primary | ICD-10-CM

## 2021-09-07 DIAGNOSIS — Z87.891 FORMER CIGARETTE SMOKER: ICD-10-CM

## 2021-09-07 PROCEDURE — 99213 OFFICE O/P EST LOW 20 MIN: CPT | Performed by: NURSE PRACTITIONER

## 2021-09-08 ENCOUNTER — PATIENT ROUNDING (BHMG ONLY) (OUTPATIENT)
Dept: NEUROSURGERY | Facility: CLINIC | Age: 36
End: 2021-09-08

## 2021-09-26 DIAGNOSIS — S39.012A ACUTE MYOFASCIAL STRAIN OF LUMBAR REGION, INITIAL ENCOUNTER: ICD-10-CM

## 2021-11-30 DIAGNOSIS — S39.012A ACUTE MYOFASCIAL STRAIN OF LUMBAR REGION, INITIAL ENCOUNTER: ICD-10-CM

## 2022-01-22 DIAGNOSIS — S39.012A ACUTE MYOFASCIAL STRAIN OF LUMBAR REGION, INITIAL ENCOUNTER: ICD-10-CM

## 2022-01-26 DIAGNOSIS — S39.012A ACUTE MYOFASCIAL STRAIN OF LUMBAR REGION, INITIAL ENCOUNTER: ICD-10-CM

## 2022-12-14 ENCOUNTER — OFFICE VISIT (OUTPATIENT)
Dept: OBSTETRICS AND GYNECOLOGY | Facility: CLINIC | Age: 37
End: 2022-12-14
Payer: COMMERCIAL

## 2022-12-14 VITALS
SYSTOLIC BLOOD PRESSURE: 120 MMHG | BODY MASS INDEX: 40.04 KG/M2 | DIASTOLIC BLOOD PRESSURE: 78 MMHG | WEIGHT: 226 LBS | HEIGHT: 63 IN

## 2022-12-14 DIAGNOSIS — Z01.419 ENCOUNTER FOR WELL WOMAN EXAM WITH ROUTINE GYNECOLOGICAL EXAM: Primary | ICD-10-CM

## 2022-12-14 DIAGNOSIS — R21 RASH OF ENTIRE BODY: ICD-10-CM

## 2022-12-14 DIAGNOSIS — Z12.4 ENCOUNTER FOR SCREENING FOR CERVICAL CANCER: ICD-10-CM

## 2022-12-14 PROCEDURE — G0123 SCREEN CERV/VAG THIN LAYER: HCPCS | Performed by: NURSE PRACTITIONER

## 2022-12-14 PROCEDURE — 99395 PREV VISIT EST AGE 18-39: CPT | Performed by: NURSE PRACTITIONER

## 2022-12-14 PROCEDURE — 3008F BODY MASS INDEX DOCD: CPT | Performed by: NURSE PRACTITIONER

## 2022-12-14 PROCEDURE — 87624 HPV HI-RISK TYP POOLED RSLT: CPT | Performed by: NURSE PRACTITIONER

## 2022-12-14 PROCEDURE — 2014F MENTAL STATUS ASSESS: CPT | Performed by: NURSE PRACTITIONER

## 2022-12-16 LAB
GEN CATEG CVX/VAG CYTO-IMP: NORMAL
HPV I/H RISK 4 DNA CVX QL PROBE+SIG AMP: NOT DETECTED
LAB AP CASE REPORT: NORMAL
LAB AP GYN ADDITIONAL INFORMATION: NORMAL
LAB AP GYN OTHER FINDINGS: NORMAL
Lab: NORMAL
PATH INTERP SPEC-IMP: NORMAL
STAT OF ADQ CVX/VAG CYTO-IMP: NORMAL

## 2023-02-01 ENCOUNTER — APPOINTMENT (OUTPATIENT)
Dept: GENERAL RADIOLOGY | Facility: HOSPITAL | Age: 38
End: 2023-02-01
Payer: COMMERCIAL

## 2023-02-01 ENCOUNTER — APPOINTMENT (OUTPATIENT)
Dept: CT IMAGING | Facility: HOSPITAL | Age: 38
End: 2023-02-01
Payer: COMMERCIAL

## 2023-02-01 ENCOUNTER — HOSPITAL ENCOUNTER (EMERGENCY)
Facility: HOSPITAL | Age: 38
Discharge: HOME | End: 2023-02-01
Admitting: FAMILY MEDICINE
Payer: COMMERCIAL

## 2023-02-01 VITALS
OXYGEN SATURATION: 96 % | SYSTOLIC BLOOD PRESSURE: 152 MMHG | BODY MASS INDEX: 37.92 KG/M2 | HEIGHT: 63 IN | HEART RATE: 88 BPM | WEIGHT: 214 LBS | DIASTOLIC BLOOD PRESSURE: 104 MMHG | RESPIRATION RATE: 16 BRPM | TEMPERATURE: 99.1 F
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
## 2023-02-01 DIAGNOSIS — M54.6 ACUTE MIDLINE THORACIC BACK PAIN: ICD-10-CM

## 2023-02-01 LAB
B-HCG UR QL: NEGATIVE
EXPIRATION DATE: NORMAL
INTERNAL NEGATIVE CONTROL: NORMAL
INTERNAL POSITIVE CONTROL: NORMAL
Lab: NORMAL

## 2023-02-01 PROCEDURE — 73630 X-RAY EXAM OF FOOT: CPT

## 2023-02-01 PROCEDURE — 99283 EMERGENCY DEPT VISIT LOW MDM: CPT

## 2023-02-01 PROCEDURE — 72128 CT CHEST SPINE W/O DYE: CPT

## 2023-02-01 PROCEDURE — 81025 URINE PREGNANCY TEST: CPT

## 2023-02-01 PROCEDURE — 73610 X-RAY EXAM OF ANKLE: CPT

## 2023-02-01 PROCEDURE — 73130 X-RAY EXAM OF HAND: CPT

## 2023-02-01 PROCEDURE — 99282 EMERGENCY DEPT VISIT SF MDM: CPT

## 2023-02-07 ENCOUNTER — HOSPITAL ENCOUNTER (OUTPATIENT)
Dept: GENERAL RADIOLOGY | Facility: HOSPITAL | Age: 38
Discharge: HOME | End: 2023-02-07
Admitting: NURSE PRACTITIONER
Payer: COMMERCIAL

## 2023-02-07 ENCOUNTER — OFFICE VISIT (OUTPATIENT)
Dept: INTERNAL MEDICINE | Facility: CLINIC | Age: 38
End: 2023-02-07
Payer: COMMERCIAL

## 2023-02-07 VITALS
DIASTOLIC BLOOD PRESSURE: 78 MMHG | HEART RATE: 92 BPM | BODY MASS INDEX: 37.74 KG/M2 | HEIGHT: 63 IN | SYSTOLIC BLOOD PRESSURE: 122 MMHG | WEIGHT: 213 LBS | OXYGEN SATURATION: 96 %

## 2023-02-07 DIAGNOSIS — M25.562 ACUTE PAIN OF LEFT KNEE: ICD-10-CM

## 2023-02-07 DIAGNOSIS — M79.641 RIGHT HAND PAIN: Primary | ICD-10-CM

## 2023-02-07 DIAGNOSIS — L73.9 FOLLICULITIS: ICD-10-CM

## 2023-02-07 DIAGNOSIS — S90.852A FOREIGN BODY IN LEFT FOOT, INITIAL ENCOUNTER: ICD-10-CM

## 2023-02-07 DIAGNOSIS — M79.641 RIGHT HAND PAIN: ICD-10-CM

## 2023-02-07 PROCEDURE — 73130 X-RAY EXAM OF HAND: CPT

## 2023-02-07 PROCEDURE — 73560 X-RAY EXAM OF KNEE 1 OR 2: CPT

## 2023-02-07 PROCEDURE — 99214 OFFICE O/P EST MOD 30 MIN: CPT | Performed by: NURSE PRACTITIONER

## 2023-02-07 RX ORDER — CHLORHEXIDINE GLUCONATE 4 G/100ML
SOLUTION TOPICAL DAILY PRN
Qty: 118 ML | Refills: 0 | Status: SHIPPED | OUTPATIENT
Start: 2023-02-07

## 2023-02-07 RX ORDER — DOXYCYCLINE HYCLATE 100 MG/1
100 CAPSULE ORAL 2 TIMES DAILY
Qty: 14 CAPSULE | Refills: 0 | Status: SHIPPED | OUTPATIENT
Start: 2023-02-07 | End: 2023-03-10

## 2023-02-07 RX ORDER — CHLORHEXIDINE GLUCONATE 4 G/100ML
SOLUTION TOPICAL DAILY PRN
Qty: 118 ML | Refills: 0 | Status: SHIPPED | OUTPATIENT
Start: 2023-02-07 | End: 2023-02-07 | Stop reason: SDUPTHER

## 2023-02-27 ENCOUNTER — TELEPHONE (OUTPATIENT)
Dept: INTERNAL MEDICINE | Facility: CLINIC | Age: 38
End: 2023-02-27

## 2023-02-27 ENCOUNTER — OFFICE VISIT (OUTPATIENT)
Dept: INTERNAL MEDICINE | Facility: CLINIC | Age: 38
End: 2023-02-27
Payer: COMMERCIAL

## 2023-02-27 VITALS
SYSTOLIC BLOOD PRESSURE: 128 MMHG | HEART RATE: 96 BPM | TEMPERATURE: 98.2 F | OXYGEN SATURATION: 99 % | DIASTOLIC BLOOD PRESSURE: 92 MMHG | HEIGHT: 63 IN | BODY MASS INDEX: 36.14 KG/M2 | WEIGHT: 204 LBS | RESPIRATION RATE: 16 BRPM

## 2023-02-27 DIAGNOSIS — L73.9 FOLLICULITIS: ICD-10-CM

## 2023-02-27 DIAGNOSIS — L73.9 FOLLICULITIS: Primary | ICD-10-CM

## 2023-02-27 DIAGNOSIS — M25.562 ACUTE PAIN OF LEFT KNEE: Primary | ICD-10-CM

## 2023-02-27 DIAGNOSIS — R21 SKIN RASH: ICD-10-CM

## 2023-02-27 PROCEDURE — 99213 OFFICE O/P EST LOW 20 MIN: CPT | Performed by: NURSE PRACTITIONER

## 2023-02-27 RX ORDER — CLINDAMYCIN PHOSPHATE 11.9 MG/ML
SOLUTION TOPICAL 2 TIMES DAILY
Qty: 60 ML | Refills: 2 | Status: SHIPPED | OUTPATIENT
Start: 2023-02-27

## 2023-03-02 ENCOUNTER — TELEPHONE (OUTPATIENT)
Dept: INTERNAL MEDICINE | Facility: CLINIC | Age: 38
End: 2023-03-02

## 2023-03-02 DIAGNOSIS — G56.00 CARPAL TUNNEL SYNDROME, UNSPECIFIED LATERALITY: Primary | ICD-10-CM

## 2023-03-06 ENCOUNTER — HOSPITAL ENCOUNTER (OUTPATIENT)
Dept: ULTRASOUND IMAGING | Facility: HOSPITAL | Age: 38
Discharge: HOME | End: 2023-03-06
Admitting: NURSE PRACTITIONER
Payer: COMMERCIAL

## 2023-03-06 DIAGNOSIS — M25.562 ACUTE PAIN OF LEFT KNEE: ICD-10-CM

## 2023-03-06 PROCEDURE — 76882 US LMTD JT/FCL EVL NVASC XTR: CPT

## 2023-03-07 ENCOUNTER — TELEPHONE (OUTPATIENT)
Dept: INTERNAL MEDICINE | Facility: CLINIC | Age: 38
End: 2023-03-07
Payer: COMMERCIAL

## 2023-03-10 ENCOUNTER — OFFICE VISIT (OUTPATIENT)
Dept: INTERNAL MEDICINE | Facility: CLINIC | Age: 38
End: 2023-03-10
Payer: COMMERCIAL

## 2023-03-10 VITALS
SYSTOLIC BLOOD PRESSURE: 120 MMHG | OXYGEN SATURATION: 98 % | WEIGHT: 209 LBS | BODY MASS INDEX: 37.03 KG/M2 | HEART RATE: 84 BPM | DIASTOLIC BLOOD PRESSURE: 78 MMHG | HEIGHT: 63 IN

## 2023-03-10 DIAGNOSIS — L08.9 SKIN INFECTION: Primary | ICD-10-CM

## 2023-03-10 PROCEDURE — 1159F MED LIST DOCD IN RCRD: CPT | Performed by: NURSE PRACTITIONER

## 2023-03-10 PROCEDURE — 99213 OFFICE O/P EST LOW 20 MIN: CPT | Performed by: NURSE PRACTITIONER

## 2023-03-10 PROCEDURE — 1160F RVW MEDS BY RX/DR IN RCRD: CPT | Performed by: NURSE PRACTITIONER

## 2023-03-10 RX ORDER — SULFAMETHOXAZOLE AND TRIMETHOPRIM 800; 160 MG/1; MG/1
1 TABLET ORAL 2 TIMES DAILY
Qty: 14 TABLET | Refills: 0 | Status: SHIPPED | OUTPATIENT
Start: 2023-03-10 | End: 2023-04-07

## 2023-03-21 ENCOUNTER — TELEPHONE (OUTPATIENT)
Dept: INTERNAL MEDICINE | Facility: CLINIC | Age: 38
End: 2023-03-21

## 2023-04-03 ENCOUNTER — PATIENT MESSAGE (OUTPATIENT)
Dept: INTERNAL MEDICINE | Facility: CLINIC | Age: 38
End: 2023-04-03
Payer: COMMERCIAL

## 2023-04-07 ENCOUNTER — OFFICE VISIT (OUTPATIENT)
Dept: INTERNAL MEDICINE | Facility: CLINIC | Age: 38
End: 2023-04-07
Payer: COMMERCIAL

## 2023-04-07 VITALS
RESPIRATION RATE: 16 BRPM | SYSTOLIC BLOOD PRESSURE: 126 MMHG | HEIGHT: 63 IN | TEMPERATURE: 98 F | DIASTOLIC BLOOD PRESSURE: 82 MMHG | WEIGHT: 206 LBS | BODY MASS INDEX: 36.5 KG/M2 | HEART RATE: 77 BPM | OXYGEN SATURATION: 98 %

## 2023-04-07 DIAGNOSIS — L70.9 ACNE, UNSPECIFIED ACNE TYPE: Primary | ICD-10-CM

## 2023-04-07 DIAGNOSIS — M25.562 CHRONIC PAIN OF LEFT KNEE: ICD-10-CM

## 2023-04-07 DIAGNOSIS — G89.29 CHRONIC PAIN OF LEFT KNEE: ICD-10-CM

## 2023-04-07 DIAGNOSIS — S80.02XA HEMATOMA OF LEFT KNEE REGION: ICD-10-CM

## 2023-04-07 RX ORDER — DOXYCYCLINE HYCLATE 50 MG/1
50 CAPSULE ORAL 2 TIMES DAILY
Qty: 60 CAPSULE | Refills: 3 | Status: SHIPPED | OUTPATIENT
Start: 2023-04-07

## 2023-05-25 ENCOUNTER — TELEPHONE (OUTPATIENT)
Dept: PODIATRY | Facility: CLINIC | Age: 38
End: 2023-05-25
Payer: COMMERCIAL

## 2023-06-26 ENCOUNTER — HOSPITAL ENCOUNTER (INPATIENT)
Age: 38
LOS: 3 days | Discharge: HOME | DRG: 885 | End: 2023-06-30
Attending: EMERGENCY MEDICINE | Admitting: PSYCHIATRY & NEUROLOGY
Payer: MEDICAID

## 2023-06-26 DIAGNOSIS — F23 ACUTE PSYCHOSIS (HCC): Primary | ICD-10-CM

## 2023-06-26 DIAGNOSIS — Z86.59 HISTORY OF SCHIZOPHRENIA: ICD-10-CM

## 2023-06-26 LAB
ALBUMIN SERPL-MCNC: 4.7 G/DL (ref 3.5–5.2)
ALP SERPL-CCNC: 74 U/L (ref 35–104)
ALT SERPL-CCNC: 43 U/L (ref 5–33)
AMPHET UR QL SCN: NEGATIVE
ANION GAP SERPL CALCULATED.3IONS-SCNC: 14 MMOL/L (ref 7–19)
AST SERPL-CCNC: 47 U/L (ref 5–32)
BARBITURATES UR QL SCN: NEGATIVE
BASOPHILS # BLD: 0.1 K/UL (ref 0–0.2)
BASOPHILS NFR BLD: 0.7 % (ref 0–1)
BENZODIAZ UR QL SCN: NEGATIVE
BILIRUB SERPL-MCNC: 1 MG/DL (ref 0.2–1.2)
BUN SERPL-MCNC: 7 MG/DL (ref 6–20)
BUPRENORPHINE URINE: NEGATIVE
CALCIUM SERPL-MCNC: 9.4 MG/DL (ref 8.6–10)
CANNABINOIDS UR QL SCN: POSITIVE
CHLORIDE SERPL-SCNC: 103 MMOL/L (ref 98–111)
CO2 SERPL-SCNC: 20 MMOL/L (ref 22–29)
COCAINE UR QL SCN: NEGATIVE
CREAT SERPL-MCNC: 0.9 MG/DL (ref 0.5–0.9)
DRUG SCREEN COMMENT UR-IMP: ABNORMAL
EOSINOPHIL # BLD: 0.1 K/UL (ref 0–0.6)
EOSINOPHIL NFR BLD: 0.6 % (ref 0–5)
ERYTHROCYTE [DISTWIDTH] IN BLOOD BY AUTOMATED COUNT: 12.7 % (ref 11.5–14.5)
ETHANOLAMINE SERPL-MCNC: 22 MG/DL (ref 0–0.08)
GLUCOSE SERPL-MCNC: 117 MG/DL (ref 74–109)
HCG SERPL QL: NEGATIVE
HCT VFR BLD AUTO: 40.9 % (ref 37–47)
HGB BLD-MCNC: 14.2 G/DL (ref 12–16)
IMM GRANULOCYTES # BLD: 0 K/UL
LYMPHOCYTES # BLD: 2.6 K/UL (ref 1.1–4.5)
LYMPHOCYTES NFR BLD: 19.1 % (ref 20–40)
MAGNESIUM SERPL-MCNC: 1.8 MG/DL (ref 1.6–2.6)
MCH RBC QN AUTO: 32.1 PG (ref 27–31)
MCHC RBC AUTO-ENTMCNC: 34.7 G/DL (ref 33–37)
MCV RBC AUTO: 92.5 FL (ref 81–99)
METHADONE UR QL SCN: NEGATIVE
METHAMPHETAMINE, URINE: NEGATIVE
MONOCYTES # BLD: 1.1 K/UL (ref 0–0.9)
MONOCYTES NFR BLD: 8.5 % (ref 0–10)
NEUTROPHILS # BLD: 9.5 K/UL (ref 1.5–7.5)
NEUTS SEG NFR BLD: 70.8 % (ref 50–65)
OPIATES UR QL SCN: NEGATIVE
OXYCODONE UR QL SCN: NEGATIVE
PCP UR QL SCN: NEGATIVE
PLATELET # BLD AUTO: 330 K/UL (ref 130–400)
PMV BLD AUTO: 10.9 FL (ref 9.4–12.3)
POTASSIUM SERPL-SCNC: 3.1 MMOL/L (ref 3.5–5)
PROPOXYPH UR QL SCN: NEGATIVE
PROT SERPL-MCNC: 7.8 G/DL (ref 6.6–8.7)
RBC # BLD AUTO: 4.42 M/UL (ref 4.2–5.4)
SODIUM SERPL-SCNC: 137 MMOL/L (ref 136–145)
TRICYCLIC, URINE: NEGATIVE
VALPROATE SERPL-MCNC: 78.5 UG/ML (ref 50–100)
WBC # BLD AUTO: 13.4 K/UL (ref 4.8–10.8)

## 2023-06-26 PROCEDURE — 36415 COLL VENOUS BLD VENIPUNCTURE: CPT

## 2023-06-26 PROCEDURE — 85025 COMPLETE CBC W/AUTO DIFF WBC: CPT

## 2023-06-26 PROCEDURE — 99283 EMERGENCY DEPT VISIT LOW MDM: CPT

## 2023-06-26 PROCEDURE — 80164 ASSAY DIPROPYLACETIC ACD TOT: CPT

## 2023-06-26 PROCEDURE — 80053 COMPREHEN METABOLIC PANEL: CPT

## 2023-06-26 PROCEDURE — 84703 CHORIONIC GONADOTROPIN ASSAY: CPT

## 2023-06-26 PROCEDURE — 82077 ASSAY SPEC XCP UR&BREATH IA: CPT

## 2023-06-26 PROCEDURE — 80306 DRUG TEST PRSMV INSTRMNT: CPT

## 2023-06-26 PROCEDURE — 83735 ASSAY OF MAGNESIUM: CPT

## 2023-06-26 RX ORDER — POTASSIUM CHLORIDE 20 MEQ/1
40 TABLET, EXTENDED RELEASE ORAL ONCE
Status: DISCONTINUED | OUTPATIENT
Start: 2023-06-26 | End: 2023-06-30 | Stop reason: HOSPADM

## 2023-06-27 PROBLEM — F29 PSYCHOSIS, UNSPECIFIED PSYCHOSIS TYPE (HCC): Status: ACTIVE | Noted: 2023-06-27

## 2023-06-27 PROBLEM — F39 UNSPECIFIED MOOD (AFFECTIVE) DISORDER (HCC): Status: ACTIVE | Noted: 2023-06-27

## 2023-06-27 PROCEDURE — 6370000000 HC RX 637 (ALT 250 FOR IP): Performed by: PSYCHIATRY & NEUROLOGY

## 2023-06-27 PROCEDURE — 6360000002 HC RX W HCPCS

## 2023-06-27 PROCEDURE — 1240000000 HC EMOTIONAL WELLNESS R&B

## 2023-06-27 PROCEDURE — 99222 1ST HOSP IP/OBS MODERATE 55: CPT | Performed by: PSYCHIATRY & NEUROLOGY

## 2023-06-27 RX ORDER — RISPERIDONE 1 MG/1
1 TABLET ORAL 2 TIMES DAILY
Status: DISCONTINUED | OUTPATIENT
Start: 2023-06-27 | End: 2023-06-30 | Stop reason: HOSPADM

## 2023-06-27 RX ORDER — HALOPERIDOL 5 MG/ML
INJECTION INTRAMUSCULAR
Status: COMPLETED
Start: 2023-06-27 | End: 2023-06-27

## 2023-06-27 RX ORDER — RISPERIDONE 1 MG/1
2 TABLET, ORALLY DISINTEGRATING ORAL 3 TIMES DAILY PRN
Status: DISCONTINUED | OUTPATIENT
Start: 2023-06-27 | End: 2023-06-30 | Stop reason: HOSPADM

## 2023-06-27 RX ORDER — TRAZODONE HYDROCHLORIDE 50 MG/1
50 TABLET ORAL NIGHTLY PRN
Status: DISCONTINUED | OUTPATIENT
Start: 2023-06-27 | End: 2023-06-30 | Stop reason: HOSPADM

## 2023-06-27 RX ORDER — POLYETHYLENE GLYCOL 3350 17 G/17G
17 POWDER, FOR SOLUTION ORAL DAILY PRN
Status: DISCONTINUED | OUTPATIENT
Start: 2023-06-27 | End: 2023-06-30 | Stop reason: HOSPADM

## 2023-06-27 RX ORDER — HYDROXYZINE HYDROCHLORIDE 25 MG/1
25 TABLET, FILM COATED ORAL 3 TIMES DAILY PRN
Status: DISCONTINUED | OUTPATIENT
Start: 2023-06-27 | End: 2023-06-30 | Stop reason: HOSPADM

## 2023-06-27 RX ORDER — MECOBALAMIN 5000 MCG
5 TABLET,DISINTEGRATING ORAL NIGHTLY PRN
Status: DISCONTINUED | OUTPATIENT
Start: 2023-06-27 | End: 2023-06-30 | Stop reason: HOSPADM

## 2023-06-27 RX ORDER — DIVALPROEX SODIUM 500 MG/1
1000 TABLET, EXTENDED RELEASE ORAL DAILY
Status: DISCONTINUED | OUTPATIENT
Start: 2023-06-27 | End: 2023-06-30 | Stop reason: HOSPADM

## 2023-06-27 RX ORDER — HALOPERIDOL 5 MG/ML
5 INJECTION INTRAMUSCULAR EVERY 6 HOURS PRN
Status: DISCONTINUED | OUTPATIENT
Start: 2023-06-27 | End: 2023-06-30 | Stop reason: HOSPADM

## 2023-06-27 RX ORDER — LORAZEPAM 2 MG/ML
2 INJECTION INTRAMUSCULAR EVERY 6 HOURS PRN
Status: DISCONTINUED | OUTPATIENT
Start: 2023-06-27 | End: 2023-06-30 | Stop reason: HOSPADM

## 2023-06-27 RX ORDER — ACETAMINOPHEN 325 MG/1
650 TABLET ORAL EVERY 4 HOURS PRN
Status: DISCONTINUED | OUTPATIENT
Start: 2023-06-27 | End: 2023-06-30 | Stop reason: HOSPADM

## 2023-06-27 RX ORDER — LORAZEPAM 2 MG/ML
INJECTION INTRAMUSCULAR
Status: COMPLETED
Start: 2023-06-27 | End: 2023-06-27

## 2023-06-27 RX ADMIN — LORAZEPAM 2 MG: 2 INJECTION INTRAMUSCULAR at 07:44

## 2023-06-27 RX ADMIN — HALOPERIDOL LACTATE 5 MG: 5 INJECTION, SOLUTION INTRAMUSCULAR at 07:45

## 2023-06-27 RX ADMIN — HYDROXYZINE HYDROCHLORIDE 25 MG: 25 TABLET, FILM COATED ORAL at 15:53

## 2023-06-27 RX ADMIN — RISPERIDONE 1 MG: 1 TABLET ORAL at 13:47

## 2023-06-27 RX ADMIN — Medication 5 MG: at 20:42

## 2023-06-27 RX ADMIN — DIVALPROEX SODIUM 1000 MG: 500 TABLET, FILM COATED, EXTENDED RELEASE ORAL at 13:47

## 2023-06-27 RX ADMIN — RISPERIDONE 2 MG: 1 TABLET, ORALLY DISINTEGRATING ORAL at 01:36

## 2023-06-27 RX ADMIN — LORAZEPAM 2 MG: 2 INJECTION INTRAMUSCULAR; INTRAVENOUS at 07:44

## 2023-06-27 RX ADMIN — RISPERIDONE 1 MG: 1 TABLET ORAL at 20:42

## 2023-06-27 RX ADMIN — HALOPERIDOL 5 MG: 5 INJECTION INTRAMUSCULAR at 07:45

## 2023-06-27 RX ADMIN — TRAZODONE HYDROCHLORIDE 50 MG: 50 TABLET ORAL at 20:42

## 2023-06-27 ASSESSMENT — SLEEP AND FATIGUE QUESTIONNAIRES
AVERAGE NUMBER OF SLEEP HOURS: 5
DO YOU HAVE DIFFICULTY SLEEPING: YES
DO YOU USE A SLEEP AID: NO
SLEEP PATTERN: DISTURBED/INTERRUPTED SLEEP

## 2023-06-28 LAB
CHOLEST SERPL-MCNC: 127 MG/DL (ref 160–199)
HBA1C MFR BLD: 4.8 % (ref 4–6)
HDLC SERPL-MCNC: 33 MG/DL (ref 65–121)
LDLC SERPL CALC-MCNC: 77 MG/DL
TRIGL SERPL-MCNC: 84 MG/DL (ref 0–149)

## 2023-06-28 PROCEDURE — 36415 COLL VENOUS BLD VENIPUNCTURE: CPT

## 2023-06-28 PROCEDURE — 99232 SBSQ HOSP IP/OBS MODERATE 35: CPT | Performed by: PSYCHIATRY & NEUROLOGY

## 2023-06-28 PROCEDURE — 80061 LIPID PANEL: CPT

## 2023-06-28 PROCEDURE — 6370000000 HC RX 637 (ALT 250 FOR IP): Performed by: PSYCHIATRY & NEUROLOGY

## 2023-06-28 PROCEDURE — 1240000000 HC EMOTIONAL WELLNESS R&B

## 2023-06-28 PROCEDURE — 83036 HEMOGLOBIN GLYCOSYLATED A1C: CPT

## 2023-06-28 RX ADMIN — RISPERIDONE 2 MG: 1 TABLET, ORALLY DISINTEGRATING ORAL at 03:17

## 2023-06-28 RX ADMIN — Medication 5 MG: at 20:56

## 2023-06-28 RX ADMIN — RISPERIDONE 1 MG: 1 TABLET ORAL at 08:46

## 2023-06-28 RX ADMIN — HYDROXYZINE HYDROCHLORIDE 25 MG: 25 TABLET, FILM COATED ORAL at 20:56

## 2023-06-28 RX ADMIN — HYDROXYZINE HYDROCHLORIDE 25 MG: 25 TABLET, FILM COATED ORAL at 16:12

## 2023-06-28 RX ADMIN — RISPERIDONE 2 MG: 1 TABLET, ORALLY DISINTEGRATING ORAL at 13:16

## 2023-06-28 RX ADMIN — DIVALPROEX SODIUM 1000 MG: 500 TABLET, FILM COATED, EXTENDED RELEASE ORAL at 08:46

## 2023-06-28 RX ADMIN — HYDROXYZINE HYDROCHLORIDE 25 MG: 25 TABLET, FILM COATED ORAL at 03:17

## 2023-06-28 RX ADMIN — RISPERIDONE 1 MG: 1 TABLET ORAL at 20:56

## 2023-06-28 SDOH — ECONOMIC STABILITY: TRANSPORTATION INSECURITY
IN THE PAST 12 MONTHS, HAS LACK OF TRANSPORTATION KEPT YOU FROM MEETINGS, WORK, OR FROM GETTING THINGS NEEDED FOR DAILY LIVING?: YES

## 2023-06-28 SDOH — HEALTH STABILITY: PHYSICAL HEALTH: ON AVERAGE, HOW MANY DAYS PER WEEK DO YOU ENGAGE IN MODERATE TO STRENUOUS EXERCISE (LIKE A BRISK WALK)?: 7 DAYS

## 2023-06-28 SDOH — SOCIAL STABILITY: SOCIAL NETWORK
IN A TYPICAL WEEK, HOW MANY TIMES DO YOU TALK ON THE PHONE WITH FAMILY, FRIENDS, OR NEIGHBORS?: MORE THAN THREE TIMES A WEEK

## 2023-06-28 SDOH — ECONOMIC STABILITY: INCOME INSECURITY: IN THE LAST 12 MONTHS, WAS THERE A TIME WHEN YOU WERE NOT ABLE TO PAY THE MORTGAGE OR RENT ON TIME?: YES

## 2023-06-28 SDOH — SOCIAL STABILITY: SOCIAL NETWORK: HOW OFTEN DO YOU ATTEND CHURCH OR RELIGIOUS SERVICES?: NEVER

## 2023-06-28 SDOH — HEALTH STABILITY: MENTAL HEALTH: HOW MANY STANDARD DRINKS CONTAINING ALCOHOL DO YOU HAVE ON A TYPICAL DAY?: 7 TO 9

## 2023-06-28 SDOH — ECONOMIC STABILITY: FOOD INSECURITY: WITHIN THE PAST 12 MONTHS, THE FOOD YOU BOUGHT JUST DIDN'T LAST AND YOU DIDN'T HAVE MONEY TO GET MORE.: NEVER TRUE

## 2023-06-28 SDOH — ECONOMIC STABILITY: TRANSPORTATION INSECURITY
IN THE PAST 12 MONTHS, HAS THE LACK OF TRANSPORTATION KEPT YOU FROM MEDICAL APPOINTMENTS OR FROM GETTING MEDICATIONS?: YES

## 2023-06-28 SDOH — HEALTH STABILITY: MENTAL HEALTH: HOW OFTEN DO YOU HAVE A DRINK CONTAINING ALCOHOL?: 4 OR MORE TIMES A WEEK

## 2023-06-28 SDOH — SOCIAL STABILITY: SOCIAL INSECURITY: WITHIN THE LAST YEAR, HAVE YOU BEEN HUMILIATED OR EMOTIONALLY ABUSED IN OTHER WAYS BY YOUR PARTNER OR EX-PARTNER?: NO

## 2023-06-28 SDOH — ECONOMIC STABILITY: HOUSING INSECURITY: IN THE LAST 12 MONTHS, HOW MANY PLACES HAVE YOU LIVED?: 2

## 2023-06-28 SDOH — HEALTH STABILITY: MENTAL HEALTH
STRESS IS WHEN SOMEONE FEELS TENSE, NERVOUS, ANXIOUS, OR CAN'T SLEEP AT NIGHT BECAUSE THEIR MIND IS TROUBLED. HOW STRESSED ARE YOU?: RATHER MUCH

## 2023-06-28 SDOH — SOCIAL STABILITY: SOCIAL INSECURITY: WITHIN THE LAST YEAR, HAVE YOU BEEN AFRAID OF YOUR PARTNER OR EX-PARTNER?: NO

## 2023-06-28 SDOH — SOCIAL STABILITY: SOCIAL NETWORK: ARE YOU MARRIED, WIDOWED, DIVORCED, SEPARATED, NEVER MARRIED, OR LIVING WITH A PARTNER?: SEPARATED

## 2023-06-28 SDOH — SOCIAL STABILITY: SOCIAL INSECURITY
WITHIN THE LAST YEAR, HAVE YOU BEEN KICKED, HIT, SLAPPED, OR OTHERWISE PHYSICALLY HURT BY YOUR PARTNER OR EX-PARTNER?: NO

## 2023-06-28 SDOH — SOCIAL STABILITY: SOCIAL INSECURITY
WITHIN THE LAST YEAR, HAVE TO BEEN RAPED OR FORCED TO HAVE ANY KIND OF SEXUAL ACTIVITY BY YOUR PARTNER OR EX-PARTNER?: NO

## 2023-06-28 SDOH — SOCIAL STABILITY: SOCIAL NETWORK
DO YOU BELONG TO ANY CLUBS OR ORGANIZATIONS SUCH AS CHURCH GROUPS UNIONS, FRATERNAL OR ATHLETIC GROUPS, OR SCHOOL GROUPS?: NO

## 2023-06-28 SDOH — ECONOMIC STABILITY: INCOME INSECURITY: HOW HARD IS IT FOR YOU TO PAY FOR THE VERY BASICS LIKE FOOD, HOUSING, MEDICAL CARE, AND HEATING?: NOT HARD AT ALL

## 2023-06-28 SDOH — HEALTH STABILITY: PHYSICAL HEALTH: ON AVERAGE, HOW MANY MINUTES DO YOU ENGAGE IN EXERCISE AT THIS LEVEL?: 60 MIN

## 2023-06-28 SDOH — SOCIAL STABILITY: SOCIAL NETWORK: HOW OFTEN DO YOU GET TOGETHER WITH FRIENDS OR RELATIVES?: MORE THAN THREE TIMES A WEEK

## 2023-06-28 SDOH — ECONOMIC STABILITY: FOOD INSECURITY: WITHIN THE PAST 12 MONTHS, YOU WORRIED THAT YOUR FOOD WOULD RUN OUT BEFORE YOU GOT MONEY TO BUY MORE.: NEVER TRUE

## 2023-06-28 SDOH — SOCIAL STABILITY: SOCIAL NETWORK: HOW OFTEN DO YOU ATTENT MEETINGS OF THE CLUB OR ORGANIZATION YOU BELONG TO?: NEVER

## 2023-06-28 SDOH — ECONOMIC STABILITY: HOUSING INSECURITY
IN THE LAST 12 MONTHS, WAS THERE A TIME WHEN YOU DID NOT HAVE A STEADY PLACE TO SLEEP OR SLEPT IN A SHELTER (INCLUDING NOW)?: NO

## 2023-06-28 ASSESSMENT — PATIENT HEALTH QUESTIONNAIRE - PHQ9
2. FEELING DOWN, DEPRESSED OR HOPELESS: 3
SUM OF ALL RESPONSES TO PHQ QUESTIONS 1-9: 3
SUM OF ALL RESPONSES TO PHQ9 QUESTIONS 1 & 2: 3
1. LITTLE INTEREST OR PLEASURE IN DOING THINGS: 0
SUM OF ALL RESPONSES TO PHQ QUESTIONS 1-9: 3

## 2023-06-29 LAB
25(OH)D3 SERPL-MCNC: 20.6 NG/ML
ANION GAP SERPL CALCULATED.3IONS-SCNC: 10 MMOL/L (ref 7–19)
BUN SERPL-MCNC: 14 MG/DL (ref 6–20)
CALCIUM SERPL-MCNC: 8.8 MG/DL (ref 8.6–10)
CHLORIDE SERPL-SCNC: 103 MMOL/L (ref 98–111)
CO2 SERPL-SCNC: 26 MMOL/L (ref 22–29)
CREAT SERPL-MCNC: 0.8 MG/DL (ref 0.5–0.9)
GLUCOSE SERPL-MCNC: 92 MG/DL (ref 74–109)
MAGNESIUM SERPL-MCNC: 2.1 MG/DL (ref 1.6–2.6)
POTASSIUM SERPL-SCNC: 4 MMOL/L (ref 3.5–5)
SODIUM SERPL-SCNC: 139 MMOL/L (ref 136–145)
TSH SERPL DL<=0.005 MIU/L-ACNC: 0.99 UIU/ML (ref 0.35–5.5)
VIT B12 SERPL-MCNC: 446 PG/ML (ref 211–946)

## 2023-06-29 PROCEDURE — 1240000000 HC EMOTIONAL WELLNESS R&B

## 2023-06-29 PROCEDURE — 6370000000 HC RX 637 (ALT 250 FOR IP): Performed by: PSYCHIATRY & NEUROLOGY

## 2023-06-29 PROCEDURE — 82306 VITAMIN D 25 HYDROXY: CPT

## 2023-06-29 PROCEDURE — 82607 VITAMIN B-12: CPT

## 2023-06-29 PROCEDURE — 84443 ASSAY THYROID STIM HORMONE: CPT

## 2023-06-29 PROCEDURE — 36415 COLL VENOUS BLD VENIPUNCTURE: CPT

## 2023-06-29 PROCEDURE — 80048 BASIC METABOLIC PNL TOTAL CA: CPT

## 2023-06-29 PROCEDURE — 83735 ASSAY OF MAGNESIUM: CPT

## 2023-06-29 PROCEDURE — 99232 SBSQ HOSP IP/OBS MODERATE 35: CPT | Performed by: PSYCHIATRY & NEUROLOGY

## 2023-06-29 RX ADMIN — RISPERIDONE 1 MG: 1 TABLET ORAL at 20:28

## 2023-06-29 RX ADMIN — HYDROXYZINE HYDROCHLORIDE 25 MG: 25 TABLET, FILM COATED ORAL at 20:28

## 2023-06-29 RX ADMIN — TRAZODONE HYDROCHLORIDE 50 MG: 50 TABLET ORAL at 20:28

## 2023-06-29 RX ADMIN — RISPERIDONE 1 MG: 1 TABLET ORAL at 07:49

## 2023-06-29 RX ADMIN — DIVALPROEX SODIUM 1000 MG: 500 TABLET, FILM COATED, EXTENDED RELEASE ORAL at 07:49

## 2023-06-30 VITALS
HEIGHT: 65 IN | RESPIRATION RATE: 18 BRPM | TEMPERATURE: 96.6 F | OXYGEN SATURATION: 98 % | WEIGHT: 194.04 LBS | DIASTOLIC BLOOD PRESSURE: 79 MMHG | HEART RATE: 93 BPM | BODY MASS INDEX: 32.33 KG/M2 | SYSTOLIC BLOOD PRESSURE: 118 MMHG

## 2023-06-30 PROCEDURE — 99239 HOSP IP/OBS DSCHRG MGMT >30: CPT | Performed by: PSYCHIATRY & NEUROLOGY

## 2023-06-30 PROCEDURE — 6370000000 HC RX 637 (ALT 250 FOR IP): Performed by: PSYCHIATRY & NEUROLOGY

## 2023-06-30 PROCEDURE — 5130000000 HC BRIDGE APPOINTMENT

## 2023-06-30 RX ORDER — HYDROXYZINE HYDROCHLORIDE 25 MG/1
25 TABLET, FILM COATED ORAL 3 TIMES DAILY PRN
Qty: 90 TABLET | Refills: 1 | Status: SHIPPED | OUTPATIENT
Start: 2023-06-30

## 2023-06-30 RX ORDER — RISPERIDONE 1 MG/1
1 TABLET ORAL 2 TIMES DAILY
Qty: 60 TABLET | Refills: 1 | Status: SHIPPED | OUTPATIENT
Start: 2023-06-30

## 2023-06-30 RX ORDER — MECOBALAMIN 5000 MCG
5 TABLET,DISINTEGRATING ORAL NIGHTLY PRN
Qty: 30 TABLET | Refills: 1 | Status: SHIPPED | OUTPATIENT
Start: 2023-06-30

## 2023-06-30 RX ORDER — DIVALPROEX SODIUM 500 MG/1
1000 TABLET, EXTENDED RELEASE ORAL DAILY
Qty: 60 TABLET | Refills: 1 | Status: SHIPPED | OUTPATIENT
Start: 2023-07-01

## 2023-06-30 RX ORDER — TRAZODONE HYDROCHLORIDE 50 MG/1
50 TABLET ORAL NIGHTLY PRN
Qty: 30 TABLET | Refills: 1 | Status: SHIPPED | OUTPATIENT
Start: 2023-06-30

## 2023-06-30 RX ADMIN — RISPERIDONE 1 MG: 1 TABLET ORAL at 08:18

## 2023-06-30 RX ADMIN — DIVALPROEX SODIUM 1000 MG: 500 TABLET, FILM COATED, EXTENDED RELEASE ORAL at 08:18

## 2023-07-13 ENCOUNTER — TELEPHONE (OUTPATIENT)
Dept: INTERNAL MEDICINE | Facility: CLINIC | Age: 38
End: 2023-07-13

## 2023-08-10 DIAGNOSIS — L70.9 ACNE, UNSPECIFIED ACNE TYPE: ICD-10-CM

## 2023-08-10 RX ORDER — DOXYCYCLINE HYCLATE 50 MG/1
50 CAPSULE ORAL 2 TIMES DAILY
Qty: 60 CAPSULE | Refills: 3 | OUTPATIENT
Start: 2023-08-10

## 2023-08-15 DIAGNOSIS — L70.9 ACNE, UNSPECIFIED ACNE TYPE: ICD-10-CM

## 2023-08-15 RX ORDER — DOXYCYCLINE HYCLATE 50 MG/1
50 CAPSULE ORAL 2 TIMES DAILY
Qty: 60 CAPSULE | Refills: 3 | OUTPATIENT
Start: 2023-08-15

## 2023-08-18 ENCOUNTER — LAB (OUTPATIENT)
Dept: LAB | Facility: HOSPITAL | Age: 38
End: 2023-08-18
Payer: COMMERCIAL

## 2023-08-18 ENCOUNTER — TRANSCRIBE ORDERS (OUTPATIENT)
Dept: ADMINISTRATIVE | Facility: HOSPITAL | Age: 38
End: 2023-08-18
Payer: COMMERCIAL

## 2023-08-18 DIAGNOSIS — Z79.899 ENCOUNTER FOR LONG-TERM (CURRENT) USE OF OTHER MEDICATIONS: Primary | ICD-10-CM

## 2023-08-18 DIAGNOSIS — Z79.899 ENCOUNTER FOR LONG-TERM (CURRENT) USE OF OTHER MEDICATIONS: ICD-10-CM

## 2023-08-18 LAB
ALBUMIN SERPL-MCNC: 4.4 G/DL (ref 3.5–5.2)
ALBUMIN/GLOB SERPL: 1.6 G/DL
ALP SERPL-CCNC: 48 U/L (ref 39–117)
ALT SERPL W P-5'-P-CCNC: 19 U/L (ref 1–33)
ANION GAP SERPL CALCULATED.3IONS-SCNC: 12 MMOL/L (ref 5–15)
AST SERPL-CCNC: 18 U/L (ref 1–32)
BASOPHILS # BLD AUTO: 0.06 10*3/MM3 (ref 0–0.2)
BASOPHILS NFR BLD AUTO: 0.8 % (ref 0–1.5)
BILIRUB SERPL-MCNC: 0.3 MG/DL (ref 0–1.2)
BUN SERPL-MCNC: 12 MG/DL (ref 6–20)
BUN/CREAT SERPL: 15 (ref 7–25)
CALCIUM SPEC-SCNC: 9.1 MG/DL (ref 8.6–10.5)
CHLORIDE SERPL-SCNC: 103 MMOL/L (ref 98–107)
CHOLEST SERPL-MCNC: 174 MG/DL (ref 0–200)
CO2 SERPL-SCNC: 26 MMOL/L (ref 22–29)
CREAT SERPL-MCNC: 0.8 MG/DL (ref 0.57–1)
DEPRECATED RDW RBC AUTO: 45.6 FL (ref 37–54)
EGFRCR SERPLBLD CKD-EPI 2021: 97.5 ML/MIN/1.73
EOSINOPHIL # BLD AUTO: 0.25 10*3/MM3 (ref 0–0.4)
EOSINOPHIL NFR BLD AUTO: 3.2 % (ref 0.3–6.2)
ERYTHROCYTE [DISTWIDTH] IN BLOOD BY AUTOMATED COUNT: 12.8 % (ref 12.3–15.4)
GLOBULIN UR ELPH-MCNC: 2.7 GM/DL
GLUCOSE SERPL-MCNC: 85 MG/DL (ref 65–99)
HBA1C MFR BLD: 5.5 % (ref 4.8–5.6)
HCT VFR BLD AUTO: 42.4 % (ref 34–46.6)
HDLC SERPL-MCNC: 40 MG/DL (ref 40–60)
HGB BLD-MCNC: 13.7 G/DL (ref 12–15.9)
IMM GRANULOCYTES # BLD AUTO: 0.02 10*3/MM3 (ref 0–0.05)
IMM GRANULOCYTES NFR BLD AUTO: 0.3 % (ref 0–0.5)
LDLC SERPL CALC-MCNC: 119 MG/DL (ref 0–100)
LDLC/HDLC SERPL: 2.95 {RATIO}
LYMPHOCYTES # BLD AUTO: 2.02 10*3/MM3 (ref 0.7–3.1)
LYMPHOCYTES NFR BLD AUTO: 25.7 % (ref 19.6–45.3)
MCH RBC QN AUTO: 31.3 PG (ref 26.6–33)
MCHC RBC AUTO-ENTMCNC: 32.3 G/DL (ref 31.5–35.7)
MCV RBC AUTO: 96.8 FL (ref 79–97)
MONOCYTES # BLD AUTO: 0.74 10*3/MM3 (ref 0.1–0.9)
MONOCYTES NFR BLD AUTO: 9.4 % (ref 5–12)
NEUTROPHILS NFR BLD AUTO: 4.76 10*3/MM3 (ref 1.7–7)
NEUTROPHILS NFR BLD AUTO: 60.6 % (ref 42.7–76)
NRBC BLD AUTO-RTO: 0 /100 WBC (ref 0–0.2)
PLATELET # BLD AUTO: 333 10*3/MM3 (ref 140–450)
PMV BLD AUTO: 11.2 FL (ref 6–12)
POTASSIUM SERPL-SCNC: 4.4 MMOL/L (ref 3.5–5.2)
PROLACTIN SERPL-MCNC: 82.2 NG/ML (ref 4.79–23.3)
PROT SERPL-MCNC: 7.1 G/DL (ref 6–8.5)
RBC # BLD AUTO: 4.38 10*6/MM3 (ref 3.77–5.28)
SODIUM SERPL-SCNC: 141 MMOL/L (ref 136–145)
TRIGL SERPL-MCNC: 80 MG/DL (ref 0–150)
VALPROATE SERPL-MCNC: 44.6 MCG/ML (ref 50–125)
VLDLC SERPL-MCNC: 15 MG/DL (ref 5–40)
WBC NRBC COR # BLD: 7.85 10*3/MM3 (ref 3.4–10.8)

## 2023-08-18 PROCEDURE — 36415 COLL VENOUS BLD VENIPUNCTURE: CPT

## 2023-08-18 PROCEDURE — 83036 HEMOGLOBIN GLYCOSYLATED A1C: CPT

## 2023-08-18 PROCEDURE — 80061 LIPID PANEL: CPT

## 2023-08-18 PROCEDURE — 80164 ASSAY DIPROPYLACETIC ACD TOT: CPT

## 2023-08-18 PROCEDURE — 80053 COMPREHEN METABOLIC PANEL: CPT

## 2023-08-18 PROCEDURE — 84146 ASSAY OF PROLACTIN: CPT

## 2023-08-18 PROCEDURE — 85025 COMPLETE CBC W/AUTO DIFF WBC: CPT

## 2023-08-26 DIAGNOSIS — S80.02XA HEMATOMA OF LEFT KNEE REGION: ICD-10-CM

## 2023-08-26 DIAGNOSIS — G89.29 CHRONIC PAIN OF LEFT KNEE: ICD-10-CM

## 2023-08-26 DIAGNOSIS — M25.562 CHRONIC PAIN OF LEFT KNEE: ICD-10-CM

## 2024-05-09 ENCOUNTER — HOSPITAL ENCOUNTER (INPATIENT)
Age: 39
LOS: 3 days | Discharge: HOME | DRG: 885 | End: 2024-05-13
Attending: STUDENT IN AN ORGANIZED HEALTH CARE EDUCATION/TRAINING PROGRAM | Admitting: PSYCHIATRY & NEUROLOGY
Payer: MEDICAID

## 2024-05-09 DIAGNOSIS — N39.0 ACUTE URINARY TRACT INFECTION: ICD-10-CM

## 2024-05-09 DIAGNOSIS — F29 PSYCHOSIS, UNSPECIFIED PSYCHOSIS TYPE (HCC): Primary | ICD-10-CM

## 2024-05-09 LAB
ALBUMIN SERPL-MCNC: 4.4 G/DL (ref 3.5–5.2)
ALP SERPL-CCNC: 70 U/L (ref 35–104)
ALT SERPL-CCNC: 35 U/L (ref 5–33)
AMPHET UR QL SCN: NEGATIVE
ANION GAP SERPL CALCULATED.3IONS-SCNC: 16 MMOL/L (ref 7–19)
AST SERPL-CCNC: 32 U/L (ref 5–32)
BACTERIA #/AREA URNS HPF: ABNORMAL /HPF
BARBITURATES UR QL SCN: NEGATIVE
BASOPHILS # BLD: 0.1 K/UL (ref 0–0.2)
BASOPHILS NFR BLD: 0.6 % (ref 0–1)
BENZODIAZ UR QL SCN: NEGATIVE
BILIRUB SERPL-MCNC: 0.6 MG/DL (ref 0.2–1.2)
BILIRUB UR QL STRIP: ABNORMAL
BUN SERPL-MCNC: 10 MG/DL (ref 6–20)
BUPRENORPHINE URINE: NEGATIVE
CALCIUM SERPL-MCNC: 9.4 MG/DL (ref 8.6–10)
CANNABINOIDS UR QL SCN: POSITIVE
CHLORIDE SERPL-SCNC: 102 MMOL/L (ref 98–111)
CLARITY UR: ABNORMAL
CO2 SERPL-SCNC: 22 MMOL/L (ref 22–29)
COCAINE UR QL SCN: NEGATIVE
COLOR UR: ABNORMAL
CREAT SERPL-MCNC: 0.8 MG/DL (ref 0.5–0.9)
CRYSTALS URNS MICRO: ABNORMAL /HPF
DRUG SCREEN COMMENT UR-IMP: ABNORMAL
EOSINOPHIL # BLD: 0.1 K/UL (ref 0–0.6)
EOSINOPHIL NFR BLD: 0.4 % (ref 0–5)
ERYTHROCYTE [DISTWIDTH] IN BLOOD BY AUTOMATED COUNT: 13.2 % (ref 11.5–14.5)
ETHANOLAMINE SERPL-MCNC: <10 MG/DL (ref 0–0.08)
FENTANYL SCREEN, URINE: NEGATIVE
GLUCOSE SERPL-MCNC: 120 MG/DL (ref 74–109)
GLUCOSE UR STRIP.AUTO-MCNC: NEGATIVE MG/DL
HCG UR QL: NEGATIVE
HCT VFR BLD AUTO: 42.5 % (ref 37–47)
HGB BLD-MCNC: 14.3 G/DL (ref 12–16)
HGB UR STRIP.AUTO-MCNC: NEGATIVE MG/L
HYALINE CASTS #/AREA URNS LPF: ABNORMAL /LPF (ref 0–5)
IMM GRANULOCYTES # BLD: 0.1 K/UL
KETONES UR STRIP.AUTO-MCNC: 80 MG/DL
LEUKOCYTE ESTERASE UR QL STRIP.AUTO: ABNORMAL
LYMPHOCYTES # BLD: 2 K/UL (ref 1.1–4.5)
LYMPHOCYTES NFR BLD: 16.3 % (ref 20–40)
MCH RBC QN AUTO: 30 PG (ref 27–31)
MCHC RBC AUTO-ENTMCNC: 33.6 G/DL (ref 33–37)
MCV RBC AUTO: 89.1 FL (ref 81–99)
METHADONE UR QL SCN: NEGATIVE
METHAMPHETAMINE, URINE: NEGATIVE
MONOCYTES # BLD: 0.9 K/UL (ref 0–0.9)
MONOCYTES NFR BLD: 6.9 % (ref 0–10)
MUCOUS THREADS URNS QL MICRO: ABNORMAL /LPF
NEUTROPHILS # BLD: 9.4 K/UL (ref 1.5–7.5)
NEUTS SEG NFR BLD: 75.4 % (ref 50–65)
NITRITE UR QL STRIP.AUTO: NEGATIVE
OPIATES UR QL SCN: NEGATIVE
OXYCODONE UR QL SCN: NEGATIVE
PCP UR QL SCN: NEGATIVE
PH UR STRIP.AUTO: 5.5 [PH] (ref 5–8)
PLATELET # BLD AUTO: 320 K/UL (ref 130–400)
PMV BLD AUTO: 11.7 FL (ref 9.4–12.3)
POTASSIUM SERPL-SCNC: 3.3 MMOL/L (ref 3.5–5)
PROT SERPL-MCNC: 7.7 G/DL (ref 6.6–8.7)
PROT UR STRIP.AUTO-MCNC: 100 MG/DL
RBC # BLD AUTO: 4.77 M/UL (ref 4.2–5.4)
RBC #/AREA URNS HPF: ABNORMAL /HPF (ref 0–2)
SODIUM SERPL-SCNC: 140 MMOL/L (ref 136–145)
SP GR UR STRIP.AUTO: 1.03 (ref 1–1.03)
SQUAMOUS #/AREA URNS HPF: ABNORMAL /HPF
TRICYCLIC, URINE: NEGATIVE
TSH SERPL DL<=0.005 MIU/L-ACNC: 1.02 UIU/ML (ref 0.27–4.2)
UROBILINOGEN UR STRIP.AUTO-MCNC: 1 E.U./DL
WBC # BLD AUTO: 12.5 K/UL (ref 4.8–10.8)
WBC #/AREA URNS HPF: ABNORMAL /HPF (ref 0–5)

## 2024-05-09 PROCEDURE — 99285 EMERGENCY DEPT VISIT HI MDM: CPT

## 2024-05-09 PROCEDURE — 87077 CULTURE AEROBIC IDENTIFY: CPT

## 2024-05-09 PROCEDURE — 87086 URINE CULTURE/COLONY COUNT: CPT

## 2024-05-09 PROCEDURE — 36415 COLL VENOUS BLD VENIPUNCTURE: CPT

## 2024-05-09 PROCEDURE — 82077 ASSAY SPEC XCP UR&BREATH IA: CPT

## 2024-05-09 PROCEDURE — 80053 COMPREHEN METABOLIC PANEL: CPT

## 2024-05-09 PROCEDURE — 84443 ASSAY THYROID STIM HORMONE: CPT

## 2024-05-09 PROCEDURE — 80307 DRUG TEST PRSMV CHEM ANLYZR: CPT

## 2024-05-09 PROCEDURE — G0480 DRUG TEST DEF 1-7 CLASSES: HCPCS

## 2024-05-09 PROCEDURE — 85025 COMPLETE CBC W/AUTO DIFF WBC: CPT

## 2024-05-09 PROCEDURE — 81001 URINALYSIS AUTO W/SCOPE: CPT

## 2024-05-09 PROCEDURE — 84703 CHORIONIC GONADOTROPIN ASSAY: CPT

## 2024-05-09 RX ORDER — CEPHALEXIN 250 MG/1
500 CAPSULE ORAL ONCE
Status: COMPLETED | OUTPATIENT
Start: 2024-05-09 | End: 2024-05-10

## 2024-05-09 ASSESSMENT — PAIN - FUNCTIONAL ASSESSMENT: PAIN_FUNCTIONAL_ASSESSMENT: NONE - DENIES PAIN

## 2024-05-10 PROBLEM — F23 ACUTE PSYCHOSIS (HCC): Status: ACTIVE | Noted: 2024-05-10

## 2024-05-10 PROCEDURE — 6370000000 HC RX 637 (ALT 250 FOR IP): Performed by: PSYCHIATRY & NEUROLOGY

## 2024-05-10 PROCEDURE — 6370000000 HC RX 637 (ALT 250 FOR IP): Performed by: PEDIATRICS

## 2024-05-10 PROCEDURE — 1240000000 HC EMOTIONAL WELLNESS R&B

## 2024-05-10 PROCEDURE — 99222 1ST HOSP IP/OBS MODERATE 55: CPT | Performed by: PSYCHIATRY & NEUROLOGY

## 2024-05-10 PROCEDURE — 6360000002 HC RX W HCPCS: Performed by: PSYCHIATRY & NEUROLOGY

## 2024-05-10 RX ORDER — HYDROXYZINE HYDROCHLORIDE 25 MG/1
25 TABLET, FILM COATED ORAL 3 TIMES DAILY PRN
Status: DISCONTINUED | OUTPATIENT
Start: 2024-05-10 | End: 2024-05-10

## 2024-05-10 RX ORDER — OXCARBAZEPINE 300 MG/1
300 TABLET, FILM COATED ORAL 2 TIMES DAILY
Status: DISCONTINUED | OUTPATIENT
Start: 2024-05-10 | End: 2024-05-13 | Stop reason: HOSPADM

## 2024-05-10 RX ORDER — HALOPERIDOL 5 MG/ML
5 INJECTION INTRAMUSCULAR EVERY 6 HOURS PRN
Status: DISCONTINUED | OUTPATIENT
Start: 2024-05-10 | End: 2024-05-13 | Stop reason: HOSPADM

## 2024-05-10 RX ORDER — ONDANSETRON 4 MG/1
4 TABLET, ORALLY DISINTEGRATING ORAL EVERY 8 HOURS PRN
Status: DISCONTINUED | OUTPATIENT
Start: 2024-05-10 | End: 2024-05-13 | Stop reason: HOSPADM

## 2024-05-10 RX ORDER — SULFAMETHOXAZOLE AND TRIMETHOPRIM 800; 160 MG/1; MG/1
1 TABLET ORAL EVERY 12 HOURS SCHEDULED
Status: COMPLETED | OUTPATIENT
Start: 2024-05-10 | End: 2024-05-12

## 2024-05-10 RX ORDER — TRAZODONE HYDROCHLORIDE 50 MG/1
50 TABLET ORAL NIGHTLY PRN
Status: DISCONTINUED | OUTPATIENT
Start: 2024-05-10 | End: 2024-05-13 | Stop reason: HOSPADM

## 2024-05-10 RX ORDER — HYDROXYZINE HYDROCHLORIDE 25 MG/1
50 TABLET, FILM COATED ORAL 3 TIMES DAILY PRN
Status: DISCONTINUED | OUTPATIENT
Start: 2024-05-10 | End: 2024-05-13 | Stop reason: HOSPADM

## 2024-05-10 RX ORDER — POLYETHYLENE GLYCOL 3350 17 G/17G
17 POWDER, FOR SOLUTION ORAL DAILY PRN
Status: DISCONTINUED | OUTPATIENT
Start: 2024-05-10 | End: 2024-05-13 | Stop reason: HOSPADM

## 2024-05-10 RX ORDER — POLYETHYLENE GLYCOL 3350 17 G
2 POWDER IN PACKET (EA) ORAL
Status: DISCONTINUED | OUTPATIENT
Start: 2024-05-10 | End: 2024-05-13 | Stop reason: HOSPADM

## 2024-05-10 RX ORDER — RISPERIDONE 1 MG/1
1 TABLET ORAL 2 TIMES DAILY
Status: DISCONTINUED | OUTPATIENT
Start: 2024-05-10 | End: 2024-05-12

## 2024-05-10 RX ORDER — LORAZEPAM 2 MG/ML
2 INJECTION INTRAMUSCULAR EVERY 6 HOURS PRN
Status: DISCONTINUED | OUTPATIENT
Start: 2024-05-10 | End: 2024-05-13 | Stop reason: HOSPADM

## 2024-05-10 RX ORDER — NICOTINE 21 MG/24HR
1 PATCH, TRANSDERMAL 24 HOURS TRANSDERMAL DAILY
Status: DISCONTINUED | OUTPATIENT
Start: 2024-05-10 | End: 2024-05-13 | Stop reason: HOSPADM

## 2024-05-10 RX ORDER — HYDROXYZINE HYDROCHLORIDE 25 MG/1
25 TABLET, FILM COATED ORAL ONCE
Status: COMPLETED | OUTPATIENT
Start: 2024-05-10 | End: 2024-05-10

## 2024-05-10 RX ORDER — ACETAMINOPHEN 325 MG/1
650 TABLET ORAL EVERY 4 HOURS PRN
Status: DISCONTINUED | OUTPATIENT
Start: 2024-05-10 | End: 2024-05-13 | Stop reason: HOSPADM

## 2024-05-10 RX ORDER — MECOBALAMIN 5000 MCG
5 TABLET,DISINTEGRATING ORAL NIGHTLY PRN
Status: DISCONTINUED | OUTPATIENT
Start: 2024-05-10 | End: 2024-05-13 | Stop reason: HOSPADM

## 2024-05-10 RX ORDER — DIVALPROEX SODIUM 500 MG/1
1000 TABLET, EXTENDED RELEASE ORAL DAILY
Status: DISCONTINUED | OUTPATIENT
Start: 2024-05-10 | End: 2024-05-10

## 2024-05-10 RX ADMIN — CEPHALEXIN 500 MG: 250 CAPSULE ORAL at 00:44

## 2024-05-10 RX ADMIN — RISPERIDONE 1 MG: 1 TABLET, FILM COATED ORAL at 10:43

## 2024-05-10 RX ADMIN — HALOPERIDOL LACTATE 5 MG: 5 INJECTION, SOLUTION INTRAMUSCULAR at 02:34

## 2024-05-10 RX ADMIN — OXCARBAZEPINE 300 MG: 300 TABLET, FILM COATED ORAL at 20:46

## 2024-05-10 RX ADMIN — ONDANSETRON 4 MG: 4 TABLET, ORALLY DISINTEGRATING ORAL at 15:37

## 2024-05-10 RX ADMIN — LORAZEPAM 2 MG: 2 INJECTION INTRAMUSCULAR; INTRAVENOUS at 02:33

## 2024-05-10 RX ADMIN — HYDROXYZINE HYDROCHLORIDE 25 MG: 25 TABLET, FILM COATED ORAL at 01:55

## 2024-05-10 RX ADMIN — RISPERIDONE 1 MG: 1 TABLET, FILM COATED ORAL at 20:47

## 2024-05-10 RX ADMIN — HYDROXYZINE HYDROCHLORIDE 25 MG: 25 TABLET, FILM COATED ORAL at 15:37

## 2024-05-10 RX ADMIN — HYDROXYZINE HYDROCHLORIDE 50 MG: 25 TABLET, FILM COATED ORAL at 20:46

## 2024-05-10 RX ADMIN — TRAZODONE HYDROCHLORIDE 50 MG: 50 TABLET ORAL at 01:55

## 2024-05-10 RX ADMIN — SULFAMETHOXAZOLE AND TRIMETHOPRIM 1 TABLET: 800; 160 TABLET ORAL at 20:47

## 2024-05-10 RX ADMIN — HYDROXYZINE HYDROCHLORIDE 25 MG: 25 TABLET, FILM COATED ORAL at 11:56

## 2024-05-10 RX ADMIN — Medication 5 MG: at 01:55

## 2024-05-10 RX ADMIN — TRAZODONE HYDROCHLORIDE 50 MG: 50 TABLET ORAL at 20:47

## 2024-05-10 RX ADMIN — OXCARBAZEPINE 300 MG: 300 TABLET, FILM COATED ORAL at 10:43

## 2024-05-10 RX ADMIN — Medication 5 MG: at 20:47

## 2024-05-10 RX ADMIN — SULFAMETHOXAZOLE AND TRIMETHOPRIM 1 TABLET: 800; 160 TABLET ORAL at 10:43

## 2024-05-10 SDOH — ECONOMIC STABILITY: FOOD INSECURITY
WITHIN THE PAST 12 MONTHS, YOU WORRIED THAT YOUR FOOD WOULD RUN OUT BEFORE YOU GOT MONEY TO BUY MORE.: PATIENT UNABLE TO ANSWER

## 2024-05-10 SDOH — ECONOMIC STABILITY: INCOME INSECURITY: IN THE PAST 12 MONTHS, HAS THE ELECTRIC, GAS, OIL, OR WATER COMPANY THREATENED TO SHUT OFF SERVICE IN YOUR HOME?: NO

## 2024-05-10 SDOH — ECONOMIC STABILITY: INCOME INSECURITY: HOW HARD IS IT FOR YOU TO PAY FOR THE VERY BASICS LIKE FOOD, HOUSING, MEDICAL CARE, AND HEATING?: NOT VERY HARD

## 2024-05-10 SDOH — ECONOMIC STABILITY: FOOD INSECURITY: WITHIN THE PAST 12 MONTHS, YOU WORRIED THAT YOUR FOOD WOULD RUN OUT BEFORE YOU GOT MONEY TO BUY MORE.: OFTEN TRUE

## 2024-05-10 SDOH — ECONOMIC STABILITY: INCOME INSECURITY
IN THE PAST 12 MONTHS, HAS THE ELECTRIC, GAS, OIL, OR WATER COMPANY THREATENED TO SHUT OFF SERVICE IN YOUR HOME?: PATIENT UNABLE TO ANSWER

## 2024-05-10 SDOH — ECONOMIC STABILITY: INCOME INSECURITY
HOW HARD IS IT FOR YOU TO PAY FOR THE VERY BASICS LIKE FOOD, HOUSING, MEDICAL CARE, AND HEATING?: PATIENT UNABLE TO ANSWER

## 2024-05-10 ASSESSMENT — PATIENT HEALTH QUESTIONNAIRE - PHQ9
SUM OF ALL RESPONSES TO PHQ QUESTIONS 1-9: 0
SUM OF ALL RESPONSES TO PHQ9 QUESTIONS 1 & 2: 0
2. FEELING DOWN, DEPRESSED OR HOPELESS: NOT AT ALL
1. LITTLE INTEREST OR PLEASURE IN DOING THINGS: NOT AT ALL

## 2024-05-10 ASSESSMENT — PAIN - FUNCTIONAL ASSESSMENT: PAIN_FUNCTIONAL_ASSESSMENT: NONE - DENIES PAIN

## 2024-05-10 ASSESSMENT — SLEEP AND FATIGUE QUESTIONNAIRES: SLEEP PATTERN: UNABLE TO ASSESS

## 2024-05-10 ASSESSMENT — LIFESTYLE VARIABLES
HOW MANY STANDARD DRINKS CONTAINING ALCOHOL DO YOU HAVE ON A TYPICAL DAY: PATIENT UNABLE TO ANSWER
HOW OFTEN DO YOU HAVE A DRINK CONTAINING ALCOHOL: PATIENT UNABLE TO ANSWER

## 2024-05-10 NOTE — ED PROVIDER NOTES
22:00 patient signed to me by Dr. Lainez due to end of shift.  Awaiting lab results for dispo.  Patient is currently pacing in room 16.  Patient states that she would like to have her trazodone so she can sleep.  Patient states that she has not slept in 3 days.    23:30  Lab results reviewed.  Patient medically cleared for psychiatric treatment.  Discussed with Dr. Wilhelm, psychiatrist, who accepts patient for further evaluation and treatment.     Marina Cheung MD  05/10/24 0884    
    Active Member of Clubs or Organizations: No     Attends Club or Organization Meetings: Never     Marital Status:    Intimate Partner Violence: Not At Risk (6/28/2023)    Humiliation, Afraid, Rape, and Kick questionnaire     Fear of Current or Ex-Partner: No     Emotionally Abused: No     Physically Abused: No     Sexually Abused: No   Housing Stability: High Risk (6/28/2023)    Housing Stability Vital Sign     Unable to Pay for Housing in the Last Year: Yes     Number of Places Lived in the Last Year: 2     Unstable Housing in the Last Year: No       SCREENINGS             PHYSICAL EXAM    (up to 7 for level 4, 8 or more for level 5)     ED Triage Vitals   BP Temp Temp Source Pulse Respirations SpO2 Height Weight - Scale   05/09/24 2010 05/09/24 2010 05/09/24 2010 05/09/24 2010 05/09/24 2010 05/09/24 2010 -- 05/09/24 1956   (!) 150/72 98.5 °F (36.9 °C) Oral (!) 112 18 99 %  88 kg (194 lb)       Physical Exam  Vitals and nursing note reviewed.   Constitutional:       General: She is not in acute distress.     Appearance: She is normal weight. She is not toxic-appearing.      Comments: Disheveled, disorganized speech, restless   HENT:      Head: Normocephalic and atraumatic.      Right Ear: External ear normal.      Left Ear: External ear normal.      Nose: No congestion.      Mouth/Throat:      Mouth: Mucous membranes are moist.      Pharynx: Oropharynx is clear.   Eyes:      General: No scleral icterus.     Extraocular Movements: Extraocular movements intact.   Cardiovascular:      Rate and Rhythm: Normal rate and regular rhythm.      Pulses: Normal pulses.      Heart sounds: No murmur heard.  Pulmonary:      Effort: Pulmonary effort is normal. No respiratory distress.      Breath sounds: No wheezing, rhonchi or rales.   Abdominal:      General: There is no distension.      Palpations: Abdomen is soft.      Tenderness: There is no abdominal tenderness.   Musculoskeletal:      Cervical back: Neck supple.

## 2024-05-10 NOTE — H&P
HISTORY and PHYSICAL      CHIEF COMPLAINT:  Psychosis    Reason for Admission:  Psychosis    History Obtained From:  Patient, chart    HISTORY OF PRESENT ILLNESS:      The patient is a 39 y.o. female who is admitted to the [facility] unit with worsening mood issues. She will not participate with my interview/exam.     Past Medical History:        Diagnosis Date    Depression     H/O borderline personality disorder     History of cervical cancer     Sciatica      Past Surgical History:        Procedure Laterality Date    COLPOSCOPY  2009    TUBAL LIGATION           Medications Prior to Admission:    Medications Prior to Admission: divalproex (DEPAKOTE ER) 500 MG extended release tablet, Take 2 tablets by mouth daily  hydrOXYzine HCl (ATARAX) 25 MG tablet, Take 1 tablet by mouth 3 times daily as needed for Itching or Anxiety  traZODone (DESYREL) 50 MG tablet, Take 1 tablet by mouth nightly as needed for Sleep  risperiDONE (RISPERDAL) 1 MG tablet, Take 1 tablet by mouth 2 times daily  melatonin 5 MG TBDP disintegrating tablet, Take 1 tablet by mouth nightly as needed (sleep)    Allergies:  Keppra [levetiracetam]    Social History:   TOBACCO:   reports that she has been smoking cigarettes. She has never used smokeless tobacco.  ETOH:   reports current alcohol use.  DRUGS:   reports current drug use. Drug: Marijuana (Weed).        Family History:   History reviewed. No pertinent family history.  REVIEW OF SYSTEMS:  Constitutional: neg  CV: neg  Pulmonary: neg  GI: neg  : neg  Psych: psychosis  Neuro: neg  Skin: neg  MusculoSkeletal: neg  HEENT: neg  Joints: neg    Vitals:  /63   Pulse (!) 103   Temp 97.8 °F (36.6 °C) (Temporal)   Resp 16   Ht 1.575 m (5' 2\")   Wt 88.9 kg (196 lb)   SpO2 98%   BMI 35.85 kg/m²     PHYSICAL EXAM:  Gen: NAD, in bed resting  HEENT: WNL  Lymph: no LAD  Neck: no JVD or masses  Chest: CTA bilat  CV: RRR  Abdomen: NT/ND  Extrem:

## 2024-05-10 NOTE — H&P
Department of Psychiatry  Attending History and Physical - Adult         CHIEF COMPLAINT:  psychosis    History obtained from:  patient    HISTORY OF PRESENT ILLNESS:          The patient is a 39 y.o. female with previous psychiatric history of schizoaffective disorder, bipolar type, cannabis use disorder, alcohol abuse, history of treatment noncompliance, who has been admitted to our psychiatric unit secondary to treatment noncompliance and psychosis.  Patient's UDS in ER was positive for cannabinoids.  Her blood alcohol level was less than 10.    Patient has been seen in her room with presence of the patient's nurse, as patient was not able to come to treatment team room for evaluation, secondary to being sedated with medications.  For initial psychiatric evaluation, please, refer to to the note of ER attending, as reflected below:  \"Violetta Jacinto is a 39 y.o. female with PMH of psychosis, schizoaffective disorder, depression, borderline personality disorder who presents to the emergency department with CC of mental health evaluation.  EMS was called by persons living with the patient for psych eval.  Upon arrival, patient has very disorganized thoughts, repeats her name to me, and is very restless.  She is however cooperative.  She endorses dysuria, denies any other medical concerns.  She is not suicidal or homicidal, but seems to be responding to internal stimuli.  She is very difficult to evaluate as she is very disorganized. \"    During the interview patient's thought process was mostly tangential and her speech was mostly nonsensical and patient was not able to provide much of reliable information.  She reported that she has been not taking prescribed psychotropic medications, due to experiencing side effects.  She reported that she continues smoking marijuana and drinks alcohol \"occasionally\".  Patient could not provide any information about current affective symptomatology, however, stated that she is

## 2024-05-10 NOTE — ED NOTES
for the following components:    Color, UA DARK YELLOW (*)     Clarity, UA TURBID (*)     Bilirubin, Urine MODERATE (*)     Ketones, Urine 80 (*)     Protein,  (*)     Leukocyte Esterase, Urine MODERATE (*)     All other components within normal limits   MICROSCOPIC URINALYSIS - Abnormal; Notable for the following components:    Mucus, UA 1+ (*)     WBC, UA 50-75 (*)     RBC, UA 11-15 (*)     All other components within normal limits     Background  Allergies:   Allergies   Allergen Reactions    Keppra [Levetiracetam] Other (See Comments)     Had a seizure while taking this medication     Current Medications:   Medications Administered         cephALEXin (KEFLEX) capsule 500 mg Admin Date  05/10/2024 Action  Given Dose  500 mg Route  Oral Administered By  Roberto Vazquez RN            History:   Past Medical History:   Diagnosis Date    Depression     H/O borderline personality disorder     History of cervical cancer     Sciatica        Assessment  Vitals: Level of Consciousness: Alert (0)   Vitals:    05/09/24 1956 05/09/24 2010 05/10/24 0049   BP:  (!) 150/72 (!) 156/73   Pulse:  (!) 112 100   Resp:  18 18   Temp:  98.5 °F (36.9 °C) 98.4 °F (36.9 °C)   TempSrc:  Oral    SpO2:  99% 99%   Weight: 88 kg (194 lb)       Predictive Model Details   No score data available for Deterioration Index      NPO? No  O2 Flow Rate: O2 Device: None (Room air)    Cardiac Rhythm:   NIH Score: NIH     Active LDA's:    Pertinent or High Risk Medications/Drips: no   If Yes, please provide details:   Blood Product Administration: no  If Yes, please provide details:   Sepsis Risk Score Sepsis Risk Score: 1.24    Admitted with Sepsis? No    Recommendation  Incomplete orders:   Patient Belongings: with security  Additional Comments:   If any further questions, please call Sending RN at [phone]     Electronically signed by: Electronically signed by Roberto Vazquez RN on 5/10/2024 at 12:49 AM

## 2024-05-10 NOTE — PLAN OF CARE
Problem: Anxiety  Goal: Will report anxiety at manageable levels  Outcome: Progressing  Flowsheets (Taken 5/10/2024 1106 by Derian Ray RN)  Will report anxiety at manageable levels:   Administer medication as ordered   Teach and rehearse alternative coping skills  Group Therapy Note     Date: 5/10/2024  Start Time: 1100  End Time:  1130  Number of Participants: 7     Type of Group: Psychoeducation     Wellness Binder Information  Module Name:  staying well  Session Number:  1     Patient's Goal:  daily maintenance and coping skills     Notes:  pt acknowledged use of positive coping skills daily to help stay well.     Status After Intervention:  Improved     Participation Level: Interactive     Participation Quality: Appropriate, Attentive, and Sharing        Speech:  normal        Thought Process/Content: Logical        Affective Functioning: Congruent        Mood: congruent        Level of consciousness:  Alert, Oriented x4, and Attentive        Response to Learning: Able to verbalize current knowledge/experience        Endings: None Reported     Modes of Intervention: Education        Discipline Responsible: Psychoeducational Specialist        Signature:  Penelope Seaman                     Provide emotional support with 1:1 interaction with staff

## 2024-05-11 LAB
25(OH)D3 SERPL-MCNC: 19.4 NG/ML
ANION GAP SERPL CALCULATED.3IONS-SCNC: 11 MMOL/L (ref 7–19)
BACTERIA UR CULT: ABNORMAL
BACTERIA UR CULT: ABNORMAL
BUN SERPL-MCNC: 8 MG/DL (ref 6–20)
CALCIUM SERPL-MCNC: 8.9 MG/DL (ref 8.6–10)
CHLORIDE SERPL-SCNC: 104 MMOL/L (ref 98–111)
CHOLEST SERPL-MCNC: 117 MG/DL (ref 160–199)
CO2 SERPL-SCNC: 24 MMOL/L (ref 22–29)
CREAT SERPL-MCNC: 0.8 MG/DL (ref 0.5–0.9)
GLUCOSE SERPL-MCNC: 100 MG/DL (ref 74–109)
HBA1C MFR BLD: 5.6 % (ref 4–6)
HDLC SERPL-MCNC: 34 MG/DL (ref 65–121)
LDLC SERPL CALC-MCNC: 68 MG/DL
MAGNESIUM SERPL-MCNC: 1.8 MG/DL (ref 1.6–2.6)
ORGANISM: ABNORMAL
POTASSIUM SERPL-SCNC: 3.5 MMOL/L (ref 3.5–5)
SODIUM SERPL-SCNC: 139 MMOL/L (ref 136–145)
TRIGL SERPL-MCNC: 76 MG/DL (ref 0–149)
VIT B12 SERPL-MCNC: 369 PG/ML (ref 211–946)

## 2024-05-11 PROCEDURE — 6370000000 HC RX 637 (ALT 250 FOR IP): Performed by: PSYCHIATRY & NEUROLOGY

## 2024-05-11 PROCEDURE — 82607 VITAMIN B-12: CPT

## 2024-05-11 PROCEDURE — 99232 SBSQ HOSP IP/OBS MODERATE 35: CPT | Performed by: PSYCHIATRY & NEUROLOGY

## 2024-05-11 PROCEDURE — 82306 VITAMIN D 25 HYDROXY: CPT

## 2024-05-11 PROCEDURE — 36415 COLL VENOUS BLD VENIPUNCTURE: CPT

## 2024-05-11 PROCEDURE — 80048 BASIC METABOLIC PNL TOTAL CA: CPT

## 2024-05-11 PROCEDURE — 1240000000 HC EMOTIONAL WELLNESS R&B

## 2024-05-11 PROCEDURE — 83735 ASSAY OF MAGNESIUM: CPT

## 2024-05-11 PROCEDURE — 83036 HEMOGLOBIN GLYCOSYLATED A1C: CPT

## 2024-05-11 PROCEDURE — 80061 LIPID PANEL: CPT

## 2024-05-11 RX ORDER — CALCIUM CARBONATE 500 MG/1
500 TABLET, CHEWABLE ORAL 3 TIMES DAILY PRN
Status: DISCONTINUED | OUTPATIENT
Start: 2024-05-11 | End: 2024-05-13 | Stop reason: HOSPADM

## 2024-05-11 RX ADMIN — ANTACID TABLETS 500 MG: 500 TABLET, CHEWABLE ORAL at 12:08

## 2024-05-11 RX ADMIN — TRAZODONE HYDROCHLORIDE 50 MG: 50 TABLET ORAL at 20:46

## 2024-05-11 RX ADMIN — RISPERIDONE 1 MG: 1 TABLET, FILM COATED ORAL at 20:45

## 2024-05-11 RX ADMIN — OXCARBAZEPINE 300 MG: 300 TABLET, FILM COATED ORAL at 08:02

## 2024-05-11 RX ADMIN — ONDANSETRON 4 MG: 4 TABLET, ORALLY DISINTEGRATING ORAL at 09:39

## 2024-05-11 RX ADMIN — SULFAMETHOXAZOLE AND TRIMETHOPRIM 1 TABLET: 800; 160 TABLET ORAL at 20:45

## 2024-05-11 RX ADMIN — HYDROXYZINE HYDROCHLORIDE 50 MG: 25 TABLET, FILM COATED ORAL at 08:13

## 2024-05-11 RX ADMIN — SULFAMETHOXAZOLE AND TRIMETHOPRIM 1 TABLET: 800; 160 TABLET ORAL at 08:02

## 2024-05-11 RX ADMIN — NICOTINE POLACRILEX 2 MG: 2 LOZENGE ORAL at 18:34

## 2024-05-11 RX ADMIN — Medication 5 MG: at 20:46

## 2024-05-11 RX ADMIN — HYDROXYZINE HYDROCHLORIDE 50 MG: 25 TABLET, FILM COATED ORAL at 20:46

## 2024-05-11 RX ADMIN — HYDROXYZINE HYDROCHLORIDE 50 MG: 25 TABLET, FILM COATED ORAL at 14:21

## 2024-05-11 RX ADMIN — RISPERIDONE 1 MG: 1 TABLET, FILM COATED ORAL at 08:02

## 2024-05-11 RX ADMIN — OXCARBAZEPINE 300 MG: 300 TABLET, FILM COATED ORAL at 20:45

## 2024-05-11 RX ADMIN — POLYETHYLENE GLYCOL 3350 17 G: 17 POWDER, FOR SOLUTION ORAL at 18:10

## 2024-05-11 ASSESSMENT — LIFESTYLE VARIABLES
HOW OFTEN DO YOU HAVE A DRINK CONTAINING ALCOHOL: PATIENT UNABLE TO ANSWER
HOW MANY STANDARD DRINKS CONTAINING ALCOHOL DO YOU HAVE ON A TYPICAL DAY: PATIENT UNABLE TO ANSWER

## 2024-05-11 NOTE — PLAN OF CARE
Problem: Anxiety  Goal: Will report anxiety at manageable levels  Description: INTERVENTIONS:  1. Administer medication as ordered  2. Teach and rehearse alternative coping skills  3. Provide emotional support with 1:1 interaction with staff  Outcome: Progressing  Flowsheets (Taken 5/11/2024 0835)  Will report anxiety at manageable levels:   Administer medication as ordered   Teach and rehearse alternative coping skills   Provide emotional support with 1:1 interaction with staff     Problem: Decision Making  Goal: Pt/Family able to effectively weigh alternatives and participate in decision making related to treatment and care  Description: INTERVENTIONS:  1. Determine when there are differences between patient's view, family's view, and healthcare provider's view of condition  2. Facilitate patient and family articulation of goals for care  3. Help patient and family identify pros/cons of alternative solutions  4. Provide information as requested by patient/family  5. Respect patient/family right to receive or not to receive information  6. Serve as a liaison between patient and family and health care team  7. Initiate Consults from Ethics, Palliative Care or initiate Family Care Conference as is appropriate  Outcome: Progressing  Flowsheets (Taken 5/11/2024 0835)  Patient/family able to effectively weigh alternatives and participate in decision making related to treatment and care: Determine when there are differences between patient's view, family's view, and healthcare provider's view of condition     Problem: Confusion  Goal: Confusion, delirium, dementia, or psychosis is improved or at baseline  Description: INTERVENTIONS:  1. Assess for possible contributors to thought disturbance, including medications, impaired vision or hearing, underlying metabolic abnormalities, dehydration, psychiatric diagnoses, and notify attending LIP  2. [place] high risk fall precautions, as indicated  3. Provide frequent short

## 2024-05-12 LAB
EKG P AXIS: 26 DEGREES
EKG P-R INTERVAL: 182 MS
EKG Q-T INTERVAL: 364 MS
EKG QRS DURATION: 86 MS
EKG QTC CALCULATION (BAZETT): 399 MS
EKG T AXIS: 19 DEGREES

## 2024-05-12 PROCEDURE — 93005 ELECTROCARDIOGRAM TRACING: CPT

## 2024-05-12 PROCEDURE — 6370000000 HC RX 637 (ALT 250 FOR IP): Performed by: PSYCHIATRY & NEUROLOGY

## 2024-05-12 PROCEDURE — 1240000000 HC EMOTIONAL WELLNESS R&B

## 2024-05-12 RX ORDER — ARIPIPRAZOLE 10 MG/1
20 TABLET ORAL DAILY
Status: DISCONTINUED | OUTPATIENT
Start: 2024-05-12 | End: 2024-05-13 | Stop reason: HOSPADM

## 2024-05-12 RX ADMIN — HYDROXYZINE HYDROCHLORIDE 50 MG: 25 TABLET, FILM COATED ORAL at 12:04

## 2024-05-12 RX ADMIN — NICOTINE POLACRILEX 2 MG: 2 LOZENGE ORAL at 14:19

## 2024-05-12 RX ADMIN — NICOTINE POLACRILEX 2 MG: 2 LOZENGE ORAL at 17:28

## 2024-05-12 RX ADMIN — OXCARBAZEPINE 300 MG: 300 TABLET, FILM COATED ORAL at 08:03

## 2024-05-12 RX ADMIN — NICOTINE POLACRILEX 2 MG: 2 LOZENGE ORAL at 20:29

## 2024-05-12 RX ADMIN — POLYETHYLENE GLYCOL 3350 17 G: 17 POWDER, FOR SOLUTION ORAL at 14:57

## 2024-05-12 RX ADMIN — ONDANSETRON 4 MG: 4 TABLET, ORALLY DISINTEGRATING ORAL at 14:58

## 2024-05-12 RX ADMIN — ARIPIPRAZOLE 20 MG: 10 TABLET ORAL at 12:03

## 2024-05-12 RX ADMIN — NICOTINE POLACRILEX 2 MG: 2 LOZENGE ORAL at 10:39

## 2024-05-12 RX ADMIN — RISPERIDONE 1 MG: 1 TABLET, FILM COATED ORAL at 08:03

## 2024-05-12 RX ADMIN — HYDROXYZINE HYDROCHLORIDE 50 MG: 25 TABLET, FILM COATED ORAL at 20:29

## 2024-05-12 RX ADMIN — TRAZODONE HYDROCHLORIDE 50 MG: 50 TABLET ORAL at 20:29

## 2024-05-12 RX ADMIN — SULFAMETHOXAZOLE AND TRIMETHOPRIM 1 TABLET: 800; 160 TABLET ORAL at 20:22

## 2024-05-12 RX ADMIN — Medication 5 MG: at 20:29

## 2024-05-12 RX ADMIN — SULFAMETHOXAZOLE AND TRIMETHOPRIM 1 TABLET: 800; 160 TABLET ORAL at 08:03

## 2024-05-12 RX ADMIN — OXCARBAZEPINE 300 MG: 300 TABLET, FILM COATED ORAL at 20:29

## 2024-05-12 NOTE — PLAN OF CARE
Problem: Anxiety  Goal: Will report anxiety at manageable levels  Description: INTERVENTIONS:  1. Administer medication as ordered  2. Teach and rehearse alternative coping skills  3. Provide emotional support with 1:1 interaction with staff  Outcome: Progressing  Flowsheets (Taken 5/12/2024 0825)  Will report anxiety at manageable levels:   Administer medication as ordered   Teach and rehearse alternative coping skills   Provide emotional support with 1:1 interaction with staff     Problem: Decision Making  Goal: Pt/Family able to effectively weigh alternatives and participate in decision making related to treatment and care  Description: INTERVENTIONS:  1. Determine when there are differences between patient's view, family's view, and healthcare provider's view of condition  2. Facilitate patient and family articulation of goals for care  3. Help patient and family identify pros/cons of alternative solutions  4. Provide information as requested by patient/family  5. Respect patient/family right to receive or not to receive information  6. Serve as a liaison between patient and family and health care team  7. Initiate Consults from Ethics, Palliative Care or initiate Family Care Conference as is appropriate  Outcome: Progressing  Flowsheets (Taken 5/12/2024 0825)  Patient/family able to effectively weigh alternatives and participate in decision making related to treatment and care: Determine when there are differences between patient's view, family's view, and healthcare provider's view of condition     Problem: Confusion  Goal: Confusion, delirium, dementia, or psychosis is improved or at baseline  Description: INTERVENTIONS:  1. Assess for possible contributors to thought disturbance, including medications, impaired vision or hearing, underlying metabolic abnormalities, dehydration, psychiatric diagnoses, and notify attending LIP  2. [place] high risk fall precautions, as indicated  3. Provide frequent short

## 2024-05-13 ENCOUNTER — TELEPHONE (OUTPATIENT)
Dept: INTERNAL MEDICINE | Facility: CLINIC | Age: 39
End: 2024-05-13

## 2024-05-13 VITALS
SYSTOLIC BLOOD PRESSURE: 124 MMHG | TEMPERATURE: 97.6 F | DIASTOLIC BLOOD PRESSURE: 72 MMHG | WEIGHT: 196 LBS | OXYGEN SATURATION: 99 % | BODY MASS INDEX: 36.07 KG/M2 | RESPIRATION RATE: 16 BRPM | HEART RATE: 92 BPM | HEIGHT: 62 IN

## 2024-05-13 PROCEDURE — 6370000000 HC RX 637 (ALT 250 FOR IP): Performed by: PSYCHIATRY & NEUROLOGY

## 2024-05-13 PROCEDURE — 5130000000 HC BRIDGE APPOINTMENT

## 2024-05-13 PROCEDURE — 99239 HOSP IP/OBS DSCHRG MGMT >30: CPT | Performed by: PSYCHIATRY & NEUROLOGY

## 2024-05-13 RX ORDER — ARIPIPRAZOLE 20 MG/1
20 TABLET ORAL DAILY
Qty: 30 TABLET | Refills: 1 | Status: SHIPPED | OUTPATIENT
Start: 2024-05-14

## 2024-05-13 RX ORDER — OXCARBAZEPINE 300 MG/1
300 TABLET, FILM COATED ORAL 2 TIMES DAILY
Qty: 60 TABLET | Refills: 1 | Status: SHIPPED | OUTPATIENT
Start: 2024-05-13

## 2024-05-13 RX ORDER — ERGOCALCIFEROL 1.25 MG/1
50000 CAPSULE ORAL WEEKLY
Qty: 5 CAPSULE | Refills: 0 | Status: SHIPPED | OUTPATIENT
Start: 2024-05-13 | End: 2024-07-30

## 2024-05-13 RX ORDER — MECOBALAMIN 5000 MCG
5 TABLET,DISINTEGRATING ORAL NIGHTLY PRN
Qty: 30 TABLET | Refills: 1 | Status: SHIPPED | OUTPATIENT
Start: 2024-05-13

## 2024-05-13 RX ORDER — CHOLECALCIFEROL (VITAMIN D3) 125 MCG
500 CAPSULE ORAL DAILY
Status: DISCONTINUED | OUTPATIENT
Start: 2024-05-13 | End: 2024-05-13 | Stop reason: HOSPADM

## 2024-05-13 RX ORDER — ERGOCALCIFEROL 1.25 MG/1
50000 CAPSULE ORAL WEEKLY
Status: DISCONTINUED | OUTPATIENT
Start: 2024-05-13 | End: 2024-05-13 | Stop reason: HOSPADM

## 2024-05-13 RX ORDER — HYDROXYZINE 50 MG/1
50 TABLET, FILM COATED ORAL 3 TIMES DAILY PRN
Qty: 90 TABLET | Refills: 1 | Status: SHIPPED | OUTPATIENT
Start: 2024-05-13

## 2024-05-13 RX ORDER — TRAZODONE HYDROCHLORIDE 50 MG/1
50 TABLET ORAL NIGHTLY PRN
Qty: 30 TABLET | Refills: 1 | Status: SHIPPED | OUTPATIENT
Start: 2024-05-13

## 2024-05-13 RX ADMIN — ARIPIPRAZOLE 20 MG: 10 TABLET ORAL at 07:57

## 2024-05-13 RX ADMIN — OXCARBAZEPINE 300 MG: 300 TABLET, FILM COATED ORAL at 07:57

## 2024-05-13 RX ADMIN — HYDROXYZINE HYDROCHLORIDE 50 MG: 25 TABLET, FILM COATED ORAL at 07:57

## 2024-05-13 RX ADMIN — NICOTINE POLACRILEX 2 MG: 2 LOZENGE ORAL at 04:45

## 2024-05-13 NOTE — PLAN OF CARE
Problem: Anxiety  Goal: Will report anxiety at manageable levels  Description: INTERVENTIONS:  1. Administer medication as ordered  2. Teach and rehearse alternative coping skills  3. Provide emotional support with 1:1 interaction with staff  Outcome: Completed     Problem: Decision Making  Goal: Pt/Family able to effectively weigh alternatives and participate in decision making related to treatment and care  Description: INTERVENTIONS:  1. Determine when there are differences between patient's view, family's view, and healthcare provider's view of condition  2. Facilitate patient and family articulation of goals for care  3. Help patient and family identify pros/cons of alternative solutions  4. Provide information as requested by patient/family  5. Respect patient/family right to receive or not to receive information  6. Serve as a liaison between patient and family and health care team  7. Initiate Consults from Ethics, Palliative Care or initiate Family Care Conference as is appropriate  Outcome: Completed     Problem: Confusion  Goal: Confusion, delirium, dementia, or psychosis is improved or at baseline  Description: INTERVENTIONS:  1. Assess for possible contributors to thought disturbance, including medications, impaired vision or hearing, underlying metabolic abnormalities, dehydration, psychiatric diagnoses, and notify attending LIP  2. [place] high risk fall precautions, as indicated  3. Provide frequent short contacts to provide reality reorientation, refocusing and direction  4. Decrease environmental stimuli, including noise as appropriate  5. Monitor and intervene to maintain adequate nutrition, hydration, elimination, sleep and activity  6. If unable to ensure safety without constant attention obtain sitter and review sitter guidelines with assigned personnel  7. Initiate Psychosocial CNS and Spiritual Care consult, as indicated  Outcome: Completed     Problem: Behavior  Goal: Pt/Family maintain

## 2024-05-13 NOTE — PROGRESS NOTES
Group Note    Date: 05/10/24  Start Time: 7:00 PM   End Time:8:00 PM     Number of Participants: 4    Type of Group: Recovery  AA    Patient's Goal:      Notes:      Status After Intervention:  Unchanged    Participation Level: Interactive    Participation Quality: Appropriate    Speech:  normal    Thought Process/Content: Delusional    Mood: anxious    Level of consciousness:  Alert and Preoccupied    Response to Learning: Capable of insight    Modes of Intervention: Education and Support    Discipline Responsible: Registered Nurse     Signature:  Yvonne Kevin RN  
                                                                Group Note    Date: 05/10/24  Start Time: 7:30 PM   End Time:8:00 PM     Number of Participants: 5    Type of Group: Wrap-Up     Patient's Goal:      Notes:  see wrap up sheet    Status After Intervention:  Unchanged    Participation Level: Interactive    Participation Quality: Appropriate    Speech:  normal    Thought Process/Content: Delusional    Mood: anxious    Level of consciousness:  Alert and Preoccupied    Response to Learning: Capable of insight    Modes of Intervention: Education and Support    Discipline Responsible: Registered Nurse     Signature:  Yvonne Kevin RN  
                                                                Group Note    Date: 05/10/24  Start Time: 9:30 AM   End Time:10:00 AM     Number of Participants: 9    Type of Group: Community/Goal     Patient's Goal:      Notes:  patient didn't attend group    Status After Intervention:      Participation Level:     Participation Quality:     Speech:      Thought Process/Content:     Mood:     Level of consciousness:      Response to Learning:     Modes of Intervention: Education and Support    Discipline Responsible: Behavioral Health Technician     Signature:  Grecia Singer  
                                                                Group Note    Date: 05/11/24  Start Time: 8:00 AM   End Time:8:30 AM     Number of Participants: 5    Type of Group: Community/Goal     Patient's Goal:  \"Myself\"    Notes:      Status After Intervention:      Participation Level: Active Listener    Participation Quality: Appropriate    Speech:  normal    Thought Process/Content: Logical    Mood:  Calm    Level of consciousness:  Alert    Response to Learning: Able to verbalize current knowledge/experience    Modes of Intervention: Education and Support    Discipline Responsible: Behavioral Health Technician     Signature:  PABLO SAUCEDO  
                                                                Group Note    Date: 05/13/24  Start Time: 6:45 PM   End Time:7:15 PM     Number of Participants: 7    Type of Group: Wrap-Up     Patient's Goal:      Notes:      Status After Intervention:  Improved    Participation Level: Minimal    Participation Quality: Appropriate    Speech:  normal    Thought Process/Content: Logical    Mood: anxious and depressed    Level of consciousness:  Alert and Oriented x4    Response to Learning: Able to verbalize current knowledge/experience    Modes of Intervention: Education and Support    Discipline Responsible: Registered Nurse     Signature:  CAROLYN ANDERSON RN  
                                                  Admission Note      Reason for admission/Target Symptom: Per nursing admission assessment - Reason for Admission: Violetta Jacinto is a 39 y.o. female with PMH of psychosis, schizoaffective disorder, depression, borderline personality disorder who presents to the emergency department with CC of mental health evaluation.  EMS was called by persons living with the patient for psych eval.  Upon arrival, patient has very disorganized thoughts, repeats her name to me, and is very restless.  She is however cooperative.  She endorses dysuria, denies any other medical concerns.  She is not suicidal or homicidal, but seems to be responding to internal stimuli.  She is very difficult to evaluate as she is very disorganized.    Diagnoses: Depression Nos  UDS: Cannabinoi   BAL:  Neg    SW will meet with treatment team to discuss patient's treatment including care planning, discharge planning, and follow-up needs. Patient has been admitted to [facility].     Treatment team will identify the patient's discharge needs. Appointments will be made for medication management and outpatient therapy/counseling. Pt confirmed the need for ongoing treatment post inpatient stay. Pt was also provided a handout of contact information for drug and alcohol treatment centers and other community support service such as [facility], AA, and [facility].  
                                                 Treatment Team Note:    Target Symptoms/Reason for admission: Per nursing admission assessment - Reason for Admission: Violetta Jacinto is a 39 y.o. female with PMH of psychosis, schizoaffective disorder, depression, borderline personality disorder who presents to the emergency department with CC of mental health evaluation.  EMS was called by persons living with the patient for psych eval.  Upon arrival, patient has very disorganized thoughts, repeats her name to me, and is very restless.  She is however cooperative.  She endorses dysuria, denies any other medical concerns.  She is not suicidal or homicidal, but seems to be responding to internal stimuli.  She is very difficult to evaluate as she is very disorganized.    Diagnoses per psych provider: Acute urinary tract infection [N39.0]  Psychosis, unspecified psychosis type (HCC) [F29]  Acute psychosis (HCC) [F23]    Therapist met with treatment team to discuss patients treatment and discharge plans.    Patient's aftercare plan is: [facility]    Aftercare appointments made: No - SW will make discharge appointments    Pt lives with: SW will meet with patient to gather information    Collateral obtained from: SW will meet with patient to gather information  Collateral obtained on:New admission    Attending groups: Yes    Behavior: cooperative, pleasant, affect is bright, social with staff/peers, reports she is ready to go home    Has patient been completing ADL's:  Yes    SI:  patient denies SI  Plan: no   If yes describe: N/A - patient denies plan  HI:  patient denies HI  If present describe: N/A  Delusions: patient denies delusions  If present describe: N/A  Hallucinations: patient denies hallucinations  If present describe: N/A    Patient rates their -- Depression: 1-10:  0  Anxiety:1-10:  4    Sleeping:Yes    Taking medication: Yes    Misc: Pt will be discharged today.                   
                                                 Treatment Team Note:    Target Symptoms/Reason for admission: Per nursing admission assessment - Reason for Admission: Violetta Jacinto is a 39 y.o. female with PMH of psychosis, schizoaffective disorder, depression, borderline personality disorder who presents to the emergency department with CC of mental health evaluation.  EMS was called by persons living with the patient for psych eval.  Upon arrival, patient has very disorganized thoughts, repeats her name to me, and is very restless.  She is however cooperative.  She endorses dysuria, denies any other medical concerns.  She is not suicidal or homicidal, but seems to be responding to internal stimuli.  She is very difficult to evaluate as she is very disorganized.    Diagnoses per psych provider: Acute urinary tract infection [N39.0]  Psychosis, unspecified psychosis type (HCC) [F29]  Acute psychosis (HCC) [F23]    Therapist met with treatment team to discuss patients treatment and discharge plans.    Patient's aftercare plan is: SW will meet with patient to gather information    Aftercare appointments made: No - SW will make discharge appointments    Pt lives with: SW will meet with patient to gather information    Collateral obtained from: SW will meet with patient to gather information  Collateral obtained on:new admissions    Attending groups: New admission - SW will monitor group attendance    Behavior: calm    Has patient been completing ADL's:  New admission - unknown at this time - SW will monitor    SI:  patient denies SI  Plan: no   If yes describe: N/A - patient denies plan  HI:  patient denies HI  If present describe: N/A  Delusions: patient denies delusions  If present describe: N/A  Hallucinations: patient denies hallucinations  If present describe: N/A    Patient rates their -- Depression: 1-10:  0  Anxiety:1-10:  0    Sleeping:New admission - unknown at this time.    Taking medication: New 
      Behavioral Services  Medicare Certification Upon Admission    I certify that this patient's inpatient psychiatric hospital admission is medically necessary for:    [x] (1) Treatment which could reasonably be expected to improve this patient's condition,       [x] (2) Or for diagnostic study;     AND     [x](2) The inpatient psychiatric services are provided while the individual is under the care of a physician and are included in the individualized plan of care.    Estimated length of stay/service 5-7 days    Plan for post-hospital care TBD    Electronically signed by TALON MATTHEW MD on 5/10/2024 at 9:55 AM      
 Nursing Admission Note    Reason for Admission: Violetta Jacinto is a 39 y.o. female with PMH of psychosis, schizoaffective disorder, depression, borderline personality disorder who presents to the emergency department with CC of mental health evaluation.  EMS was called by persons living with the patient for psych eval.  Upon arrival, patient has very disorganized thoughts, repeats her name to me, and is very restless.  She is however cooperative.  She endorses dysuria, denies any other medical concerns.  She is not suicidal or homicidal, but seems to be responding to internal stimuli.  She is very difficult to evaluate as she is very disorganized.    Additional Notes:    Patient is unable to answer questions. She reports that once she gets her trazodone and sleep then she will be able to answer questions in the morning. She continues to report that her personalities are taking over, especially \"Madalyn\". Violetta is the serious personality and \"Madalyn\" is the one that can goof off. She is hyper focused on medications and \"making sure that they are in check\". MD called and orders for haldol and ativan given.     Patient Active Problem List   Diagnosis    Back pain at L4-L5 level    Overactive bladder    Schizoaffective disorder, bipolar type (HCC)    Psychosis, unspecified psychosis type (HCC)    Unspecified mood (affective) disorder (HCC)    Acute psychosis (HCC)       C-SSRS:  C-SSRS Completed: no.    Risk Assessment Completed: no.    Risk of Suicide per C-SSRS:    Provider Notified of the C-SSRS Screening and   Risk Assessment Findings: NA.    Order for Constant Observer Continued: no.    If no, discontinued due to the following reasons:  Patient is on 15 minute safety checks yes.  Safety Features on the unit: yes.    No previous safety concerns while on unit: yes.  Patient reporting no thoughts of self-harm while on unit: yes.      Suicidal Ideation: TORY.    If yes, is there a plan?(Describe)   Homicidal 
Administered Atarax 50mg due to the patient having anxiety. Atarax 50mg administered without difficulty. Will continue to monitor.  
Administered Miralax 17gm due to the patient having constipation. Miralax 17gm administered without difficulty. Will continue to monitor.  
Administered Tums 500mg due to the patient having GERD. Tums 500mg administered without difficulty. Will continue to monitor.  
Administered Zofran 4mg due to the patient having nausea. Zofran 4mg administered without difficulty. Will continue to monitor.  
Behavioral Health   Discharge Note  Bridge Appointment completed: Reviewed Discharge Instructions with patient.    Patient verbalizes understanding and agreement with the discharge plan using the teachback method.     Referral for Outpatient Tobacco Cessation Counseling, upon discharge (mona X if applicable and completed):    ( )  Hospital staff assisted patient to call Quit Line or faxed referral                                   during hospitalization                  ( )  Recognizing danger situations (included triggers and roadblocks), if not completed on admission                    ( )  Coping skills (new ways to manage stress, exercise, relaxation techniques, changing routine, distraction), if not completed on admission                                                           ( )  Basic information about quitting (benefits of quitting, techniques in how to quit, available resources, if not completed on admission  ( ) [facility]   ( x) Patient refused referral  ( x) Patient refused counseling  ( x) Patient refused smoking cessation medication upon discharge    Vaccinations (mona X if applicable and completed):  ( ) Patient states already received influenza vaccine elsewhere  ( ) Patient received influenza vaccine during this hospitalization  (x ) Patient refused influenza vaccine at this time      Pt discharged with followings belongings:      Valuables sent home with patient.   Valuables retrieved from [place] and returned to patient.    Patient left department with family  via personal vehicle , discharged to home .   Patient education on aftercare instructions: yes    Patient verbalize understanding of AVS:  yes.    Suicidal Ideations? No Risk of Suicide: No Risk   HI? Negative for homicidal ideation       AVH? negative    Status EXAM upon discharge:  Mental Status and Behavioral Exam  Normal: Yes  Level of Assistance: Independent/Self  Facial Expression: 
[facility] Adult Unit Daily Assessment  Nursing Progress Note    Room: [facility]/608-02   Name: Violetta Jacinto   Age: 39 y.o.   Gender: female   Dx: Acute psychosis (HCC)  Precautions: suicide risk  Inpatient Status: voluntary     SLEEP:  Sleep Quality Good  Sleep Medications: Yes   PRN Sleep Meds: Yes     MEDICAL:  Other PRN Meds: Yes   Med Compliant: Yes  Accu-Chek: No  Oxygen/CPAP/BiPAP: No  CIWA/CINA: No   PAIN Assessment: none  Side Effects from medication: No    Metabolic Screening:  Lab Results   Component Value Date    LABA1C 5.6 05/11/2024     Lab Results   Component Value Date    CHOL 117 (L) 05/11/2024    CHOL 127 (L) 06/28/2023    CHOL 166 11/11/2014     Lab Results   Component Value Date    TRIG 76 05/11/2024    TRIG 84 06/28/2023    TRIG 94 11/11/2014     Lab Results   Component Value Date    HDL 34 (L) 05/11/2024    HDL 33 (L) 06/28/2023    HDL 37 11/11/2014     No components found for: \"LDLCAL\"  No components found for: \"LABVLDL\"  Body mass index is 35.85 kg/m².  BP Readings from Last 2 Encounters:   05/11/24 131/63   06/30/23 118/79       Medical Bed:   Is patient in a medical bed? no   If medical bed is in use, has nursing secured room while patient is awake and out of the room? NA  Has safety checks by nursing been completed on the bed/room this shift? NA    Protective Factors:  Patient identifies protective factors with nursing staff as follows:   Identifies reasons for living: Yes   Supportive Social Network or family: Yes    Belief that suicide is immoral/high spirituality: Yes   Responsibility to family or others/living with family: Yes   Fear of death or dying due to pain and suffering: Yes   Engaged in work or school: No  If Patient is unable to identify, reason why?     Depression: 0   Anxiety: 3   SI denies suicidal ideation   Risk of Suicide: No Risk  HI Negative for homicidal ideation        AVH:no If Hallucinations are present, describe?     Appetite: good   Percent Meals: 75%   Social: Yes 
Department of Psychiatry  Attending Progress Note     Chief complaint:  \"I'm better\"    SUBJECTIVE:   Chart reviewed, discussed with the team. No major issues overnight. Patient is med-compliant. No SEs. Performs ADLs. Social and goes to groups.     Patient seen in the TV area coloring with peers.  Smiled.  Labile affect.  Denies SI/HI/AVH. Rates depression 4/10, anxiety 2/10. Slept 6h. Denies physical complaints.  States she has 2 personalities - \"Anais\" and \"Violetta\".  \"They sometimes don't get along.\"  Patient feels that medications are helping.  She is hoping to go home soon.    OBJECTIVE    Physical  Wt Readings from Last 3 Encounters:   05/10/24 88.9 kg (196 lb)   06/27/23 88 kg (194 lb 0.6 oz)   08/17/20 84.1 kg (185 lb 6.4 oz)     Temp Readings from Last 3 Encounters:   05/11/24 97.5 °F (36.4 °C) (Tympanic)   06/30/23 (!) 96.6 °F (35.9 °C)   08/19/20 95.3 °F (35.2 °C) (Temporal)     BP Readings from Last 3 Encounters:   05/11/24 (!) 121/90   06/30/23 118/79   08/19/20 131/62     Pulse Readings from Last 3 Encounters:   05/11/24 (!) 104   06/30/23 93   08/19/20 107        Review of Systems: 14-point review of systems negative except as described above    Mental Status Examination:   Appearance: Stated age.  Overweight.  Gait stable.  No abnormal movements or tremor.  Behavior: Calm and cooperative, smiled  Speech: Normal in tone, volume, and quality. No slurring, dysarthria or pressured speech noted.   Mood: \"Better\"   Affect: Labile  Thought Process: Appears linear.  Thought Content: Denies suicidal and homicidal ideation.  Mild paranoia.  Perceptions: Denies auditory or visual hallucinations at present time. Not responding to internal stimuli.   Concentration: Intact.   Orientation: to person, place, date, and situation.   Language: Intact.   Fund of information: Intact.   Memory: Recent and remote appear intact.   Neurovegitative: Fair appetite and sleep.   Insight: Some  Judgment: Improving     Data  Lab 
Discharge Note     Patient is discharging on this date. Patient denies SI, HI, and AVH at this time. Patient reports improvement in behavior and is leaving unit in overall good condition. SW and patient discussed patient's follow up appointments and importance of attending appointments as scheduled, patient voiced understanding and agreement. Patient and SW also discussed patient's safety plan and patient was able to verbally identify: warning signs, coping strategies, places and people that help make the patient feel better/distract negative thoughts, friends/family/agencies/professionals the patient can reach out to in a crisis, and something that is important to the patient/worth living for. Patient was provided the national suicide prevention hotline number (1-625.581.2507) as well as local community behavioral health ([facility]) crisis number for emergencies (9-593-073-8802).     Discharge Disposition: home -lives with family      Pt to follow up with:  [facility] on May  14 , 2024 at 2:00 PM for the intake appointment. Patient will follow up with [facility] ZONIA Campos and ADELAIDA Doe  On May  23 , 2024   at 8:00 AM for the medication management appointment.     Referral to outpatient tobacco cessation counseling treatment:  Patient refused referral to outpatient tobacco cessation counseling    SW offered to assist patient with transportation, patient declined transportation assistance  
[facility] Adult Unit Daily Assessment  Nursing Progress Note    Room: 06/608-02   Name: Violetta Jacinto   Age: 39 y.o.   Gender: female   Dx: Acute psychosis (HCC)  Precautions: close watch and suicide risk  Inpatient Status: voluntary     SLEEP:  Sleep Quality Good  Sleep Medications: Yes   PRN Sleep Meds: Yes     MEDICAL:  Other PRN Meds: Yes   Med Compliant: Yes  Accu-Chek: No  Oxygen/CPAP/BiPAP: No  CIWA/CINA: No   PAIN Assessment: none  Side Effects from medication: No    Metabolic Screening:  Lab Results   Component Value Date    LABA1C 4.8 06/28/2023     Lab Results   Component Value Date    CHOL 127 (L) 06/28/2023    CHOL 166 11/11/2014     Lab Results   Component Value Date    TRIG 84 06/28/2023    TRIG 94 11/11/2014     Lab Results   Component Value Date    HDL 33 (L) 06/28/2023    HDL 37 11/11/2014     No components found for: \"LDLCAL\"  No components found for: \"LABVLDL\"  Body mass index is 35.85 kg/m².  BP Readings from Last 2 Encounters:   05/10/24 102/63   06/30/23 118/79       Medical Bed:   Is patient in a medical bed? no   If medical bed is in use, has nursing secured room while patient is awake and out of the room? NA  Has safety checks by nursing been completed on the bed/room this shift? yes    Protective Factors:  Patient identifies protective factors with nursing staff as follows:   Identifies reasons for living: my kids   Supportive Social Network or family: No    Belief that suicide is immoral/high spirituality: Yes   Responsibility to family or others/living with family: Yes   Fear of death or dying due to pain and suffering: Yes   Engaged in work or school: No, disabled  If Patient is unable to identify, reason why?     Depression: high   Anxiety: high   SI denies suicidal ideation   Risk of Suicide: No Risk  HI Negative for homicidal ideation        AVH:no If Hallucinations are present, describe?     Appetite: good   Percent Meals: 75%   Social: No   Speech: pressured   Appearance: 
Patient's anxiety has improved post being administered Atarax 50mg. Patient has no complaints at this time. Will continue to monitor.  
Patient's anxiety has improved post being administered Atarax 50mg. Patient has no complaints at this time. Will continue to monitor.  
Patient's anxiety has improved post being administered Atrax 50mg. Patient has no complaints at this time. Will continue to monitor.  
Pt is complaining of galactorrhea on risperidone.  She would like to be switched to a different medication.  We will replace with Abilify.  
[facility] Adult Unit Daily Assessment  Nursing Progress Note    Room: [facility]/608-02   Name: Violetta Jacinto   Age: 39 y.o.   Gender: female   Dx: Acute psychosis (HCC)  Precautions: suicide risk, fall risk, and seizure precautions  Inpatient Status: involuntary     SLEEP:  Sleep Quality Fair  Sleep Medications: No   PRN Sleep Meds: Yes     MEDICAL:  Other PRN Meds: Yes   Med Compliant: Yes  Accu-Chek: No  Oxygen/CPAP/BiPAP: No  CIWA/CINA: No   PAIN Assessment: none  Side Effects from medication: No    Metabolic Screening:  Lab Results   Component Value Date    LABA1C 5.6 05/11/2024     Lab Results   Component Value Date    CHOL 117 (L) 05/11/2024    CHOL 127 (L) 06/28/2023    CHOL 166 11/11/2014     Lab Results   Component Value Date    TRIG 76 05/11/2024    TRIG 84 06/28/2023    TRIG 94 11/11/2014     Lab Results   Component Value Date    HDL 34 (L) 05/11/2024    HDL 33 (L) 06/28/2023    HDL 37 11/11/2014     No components found for: \"LDLCAL\"  No components found for: \"LABVLDL\"  Body mass index is 35.85 kg/m².  BP Readings from Last 2 Encounters:   05/11/24 131/63   06/30/23 118/79       Medical Bed:   Is patient in a medical bed? no   If medical bed is in use, has nursing secured room while patient is awake and out of the room? NA  Has safety checks by nursing been completed on the bed/room this shift? yes    Protective Factors:  Patient identifies protective factors with nursing staff as follows:   Identifies reasons for living: Yes   Supportive Social Network or family: Yes    Belief that suicide is immoral/high spirituality: Yes   Responsibility to family or others/living with family: Yes   Fear of death or dying due to pain and suffering: Yes   Engaged in work or school: No  If Patient is unable to identify, reason why?     Depression: 0   Anxiety: 7   SI denies suicidal ideation   Risk of Suicide: No Risk  HI Negative for homicidal ideation        AVH:no If Hallucinations are present, describe?     Appetite: good 
SW met with patient to complete psychosocial and lifetime CSSR-S on this date. Patients long and short-term goals discussed. Patient voiced understanding. Treatment plan sheet signed. Patient verbalized understanding of the treatment plan. Patient participated in goals and objectives of the treatment plan. Patient discussed safety plan with . Discussed use of positive coping skills to reduce depression and anxiety.    In the last 6 months has the patient been a danger to self: Yes  In the last 6 months has the patient been a danger to others: No  Legal Guardian/POA: No    Activity Assessment:  Skills:  Talents:  Interests: children    Provided patient with [employer] handout entitled \"Quitting Smoking.\"  Reviewed handout with patient: addressing dangers of smoking, developing coping skills, and providing basic information about quitting.       Patient received all components practical counseling of tobacco practical counseling during the hospital stay.        
[facility] Adult Unit Daily Assessment  Nursing Progress Note    Room: [facility]/608-02   Name: Violetta Jacinto   Age: 39 y.o.   Gender: female   Dx: Acute psychosis (HCC)  Precautions: suicide risk, fall risk, and seizure precautions  Inpatient Status: voluntary     SLEEP:  Sleep Quality Good  Sleep Medications: No   PRN Sleep Meds: Yes     MEDICAL:  Other PRN Meds: Yes   Med Compliant: Yes  Accu-Chek: No  Oxygen/CPAP/BiPAP: No  CIWA/CINA: No   PAIN Assessment: none  Side Effects from medication: No    Metabolic Screening:  Lab Results   Component Value Date    LABA1C 4.8 06/28/2023     Lab Results   Component Value Date    CHOL 127 (L) 06/28/2023    CHOL 166 11/11/2014     Lab Results   Component Value Date    TRIG 84 06/28/2023    TRIG 94 11/11/2014     Lab Results   Component Value Date    HDL 33 (L) 06/28/2023    HDL 37 11/11/2014     No components found for: \"LDLCAL\"  No components found for: \"LABVLDL\"  Body mass index is 35.85 kg/m².  BP Readings from Last 2 Encounters:   05/10/24 125/89   06/30/23 118/79       Medical Bed:   Is patient in a medical bed? no   If medical bed is in use, has nursing secured room while patient is awake and out of the room? NA  Has safety checks by nursing been completed on the bed/room this shift? NA    Protective Factors:  Patient identifies protective factors with nursing staff as follows:   Identifies reasons for living: Yes   Supportive Social Network or family: No    Belief that suicide is immoral/high spirituality: Yes   Responsibility to family or others/living with family: Yes   Fear of death or dying due to pain and suffering: Yes   Engaged in work or school: No  If Patient is unable to identify, reason why?     Depression: sera   Anxiety: yes, unable to rate   SI denies suicidal ideation   Risk of Suicide: No Risk  HI Negative for homicidal ideation        AVH:no If Hallucinations are present, describe? denies    Appetite: no change from normal   Percent Meals:    Social: Yes 
[facility] Adult Unit Daily Assessment  Nursing Progress Note    Room: [facility]/608-02   Name: Violetta Jacinto   Age: 39 y.o.   Gender: female   Dx: Acute psychosis (HCC)  Precautions: suicide risk, fall risk, and seizure precautions  Inpatient Status: voluntary     SLEEP:  Sleep Quality Good  Sleep Medications: Yes Melatonin 5 mg trazodone 50 mg   PRN Sleep Meds: No     MEDICAL:  Other PRN Meds: Yes atarax 50 mg commit lozenge  Med Compliant: Yes  Accu-Chek: No  Oxygen/CPAP/BiPAP: No  CIWA/CINA: No   PAIN Assessment: none  Side Effects from medication: No    Metabolic Screening:  Lab Results   Component Value Date    LABA1C 5.6 05/11/2024     Lab Results   Component Value Date    CHOL 117 (L) 05/11/2024    CHOL 127 (L) 06/28/2023    CHOL 166 11/11/2014     Lab Results   Component Value Date    TRIG 76 05/11/2024    TRIG 84 06/28/2023    TRIG 94 11/11/2014     Lab Results   Component Value Date    HDL 34 (L) 05/11/2024    HDL 33 (L) 06/28/2023    HDL 37 11/11/2014     No components found for: \"LDLCAL\"  No components found for: \"LABVLDL\"  Body mass index is 35.85 kg/m².  BP Readings from Last 2 Encounters:   05/12/24 (!) 139/45   06/30/23 118/79       Medical Bed:   Is patient in a medical bed? no   If medical bed is in use, has nursing secured room while patient is awake and out of the room? NA  Has safety checks by nursing been completed on the bed/room this shift? yes    Protective Factors:  Patient identifies protective factors with nursing staff as follows:   Identifies reasons for living: Yes   Supportive Social Network or family: Yes    Belief that suicide is immoral/high spirituality: Yes   Responsibility to family or others/living with family: Yes   Fear of death or dying due to pain and suffering: Yes   Engaged in work or school: Yes  If Patient is unable to identify, reason why?     Depression: 0   Anxiety: 4   SI denies suicidal ideation   Risk of Suicide: No Risk  HI Negative for homicidal ideation        AVH:no 
  Speech: normal   Appearance: appropriately dressed and healthy looking    GROUP:  Group Participation: Yes  Participation Quality: Interactive    Notes: Patient is alert and is cooperative with staff. Patient is compliant with medications. Patient states she is sleeping well and has a good appetite. Patient has a flat affect and appears guarded. Patient rates her depression a 0 and rates her anxiety a 9. Patient denies SI, HI, AVH.        Electronically signed by Kristi Donato RN on 5/11/24 at 8:48 AM CDT

## 2024-05-13 NOTE — DISCHARGE INSTR - DIET

## 2024-05-13 NOTE — PLAN OF CARE
Group Therapy Note    Date: 5/13/2024  Start Time: 1000  End Time:  1030  Number of Participants: 7    Type of Group: Psychoeducation    Wellness Binder Information  Module Name:  Relapse Prevention  Session Number:  5    Group Goal for Pt:  To improve knowledge of relapse prevention strategies    Notes:  Pt demonstrated improved knowledge of relapse prevention strategies by actively participating in group discussion.    Status After Intervention:  Unchanged    Participation Level: Minimal    Participation Quality: Appropriate      Speech:  normal      Thought Process/Content: Logical      Affective Functioning: Congruent      Mood: depressed      Level of consciousness:  Alert and Oriented x4      Response to Learning: Able to verbalize current knowledge/experience, Able to verbalize/acknowledge new learning, and Progressing to goal      Endings: None Reported    Modes of Intervention: Education      Discipline Responsible: Psychoeducational Specialist      Signature:  Lucie Whitfield

## 2024-05-13 NOTE — DISCHARGE INSTRUCTIONS
the week- contact for meal times.  [employer]   [address], [place] [address]  [phone]  Serving a free take-out dinner from 4:00 PM through 5:30 PM Monday through Friday and a dine-in dinner Tuesday through Friday.    [facility]   Typically serve a daily meal and serve as point of contact for Meals on Wheels program  [facility]  [address] [place], [place] [address]  [phone]    Food Pantry  Food distribution. Most require person to bring ID/documentation. Contact for information.    [facility]   [address], [place] [address]  [phone]  [facility]  [address] [address]  [phone]  [facility]  [address] [facility] [address] [place], [place] [address]  [phone]    [facility]   Typically serve a daily meal and serve as point of contact for Meals on Wheels [facility]  [address], [place] [address]   [phone]    Food Pantry  Food distribution. Most require person to bring ID/documentation. Contact for information.    [facility]  [address], [place] [address]  [phone]  [facility]  [address] [place], [place] [address]   [facility]  [address], [place] [address]  [phone]    [facility]  Typically serve a daily lunch during the week- contact for meal times.  [facility]   [address], [place] [address]  [phone]    [facility]   Typically serve a daily meal and serve as point of contact for Meals on Wheels program  [facility]  [address], [place] [address]  [phone]    Food Pantry  Food distribution. Most require person to bring ID/documentation. Contact for information.    [address]

## 2024-05-13 NOTE — DISCHARGE SUMMARY
Discharge Summary     Patient ID:  Violetta Jacinto  [id number]  39 y.o.  1985    Admit date: 5/9/2024  Discharge date: 5/13/2024    Admitting Physician: Talon Wilhelm MD   Attending Physician: Talon Wilhelm MD  Discharge Provider: TALON WILHELM MD     Admission Diagnoses: Acute urinary tract infection [N39.0]  Psychosis, unspecified psychosis type (HCC) [F29]  Acute psychosis (HCC) [F23]    Discharge Diagnoses: Psychosis, unspecified  History schizoaffective disorder, bipolar type  Cannabis use disorder  Tobacco use disorder  Alcohol abuse  Treatment noncompliance  Urinary tract infection    Admission Condition: poor    Discharged Condition: stable    Indication for Admission: Treatment noncompliance, psychosis    HPI:   The patient is a 39 y.o. female with previous psychiatric history of schizoaffective disorder, bipolar type, cannabis use disorder, alcohol abuse, history of treatment noncompliance, who has been admitted to our psychiatric unit secondary to treatment noncompliance and psychosis.  Patient's UDS in ER was positive for cannabinoids.  Her blood alcohol level was less than 10.     Patient has been seen in her room with presence of the patient's nurse, as patient was not able to come to treatment team room for evaluation, secondary to being sedated with medications.  For initial psychiatric evaluation, please, refer to to the note of ER attending, as reflected below:  \"Violetta Jacinto is a 39 y.o. female with PMH of psychosis, schizoaffective disorder, depression, borderline personality disorder who presents to the emergency department with CC of mental health evaluation.  EMS was called by persons living with the patient for psych eval.  Upon arrival, patient has very disorganized thoughts, repeats her name to me, and is very restless.  She is however cooperative.  She endorses dysuria, denies any other medical concerns.  She is not suicidal or homicidal, but seems to be responding to internal

## 2024-06-12 ENCOUNTER — APPOINTMENT (OUTPATIENT)
Dept: CT IMAGING | Facility: HOSPITAL | Age: 39
End: 2024-06-12
Payer: COMMERCIAL

## 2024-06-12 ENCOUNTER — HOSPITAL ENCOUNTER (EMERGENCY)
Facility: HOSPITAL | Age: 39
Discharge: HOME | End: 2024-06-12
Payer: COMMERCIAL

## 2024-06-12 VITALS
HEART RATE: 99 BPM | SYSTOLIC BLOOD PRESSURE: 126 MMHG | DIASTOLIC BLOOD PRESSURE: 68 MMHG | OXYGEN SATURATION: 98 % | HEIGHT: 63 IN | WEIGHT: 180 LBS | RESPIRATION RATE: 18 BRPM | BODY MASS INDEX: 31.89 KG/M2 | TEMPERATURE: 98.2 F

## 2024-06-12 DIAGNOSIS — R10.84 GENERALIZED ABDOMINAL PAIN: ICD-10-CM

## 2024-06-12 DIAGNOSIS — R19.7 VOMITING AND DIARRHEA: Primary | ICD-10-CM

## 2024-06-12 DIAGNOSIS — R11.10 VOMITING AND DIARRHEA: Primary | ICD-10-CM

## 2024-06-12 LAB
ADV 40+41 DNA STL QL NAA+NON-PROBE: NOT DETECTED
ALBUMIN SERPL-MCNC: 4.4 G/DL (ref 3.5–5.2)
ALBUMIN/GLOB SERPL: 1.4 G/DL
ALP SERPL-CCNC: 70 U/L (ref 39–117)
ALT SERPL W P-5'-P-CCNC: 18 U/L (ref 1–33)
AMPHET+METHAMPHET UR QL: NEGATIVE
AMPHETAMINES UR QL: NEGATIVE
ANION GAP SERPL CALCULATED.3IONS-SCNC: 9 MMOL/L (ref 5–15)
AST SERPL-CCNC: 14 U/L (ref 1–32)
ASTRO TYP 1-8 RNA STL QL NAA+NON-PROBE: NOT DETECTED
BACTERIA UR QL AUTO: ABNORMAL /HPF
BARBITURATES UR QL SCN: NEGATIVE
BASOPHILS # BLD AUTO: 0.05 10*3/MM3 (ref 0–0.2)
BASOPHILS NFR BLD AUTO: 0.6 % (ref 0–1.5)
BENZODIAZ UR QL SCN: NEGATIVE
BILIRUB SERPL-MCNC: 0.3 MG/DL (ref 0–1.2)
BILIRUB UR QL STRIP: NEGATIVE
BUN SERPL-MCNC: 7 MG/DL (ref 6–20)
BUN/CREAT SERPL: 9.7 (ref 7–25)
BUPRENORPHINE SERPL-MCNC: NEGATIVE NG/ML
C CAYETANENSIS DNA STL QL NAA+NON-PROBE: NOT DETECTED
C COLI+JEJ+UPSA DNA STL QL NAA+NON-PROBE: NOT DETECTED
CALCIUM SPEC-SCNC: 9.6 MG/DL (ref 8.6–10.5)
CANNABINOIDS SERPL QL: POSITIVE
CHLORIDE SERPL-SCNC: 104 MMOL/L (ref 98–107)
CLARITY UR: CLEAR
CO2 SERPL-SCNC: 27 MMOL/L (ref 22–29)
COCAINE UR QL: NEGATIVE
COLOR UR: YELLOW
CREAT SERPL-MCNC: 0.72 MG/DL (ref 0.57–1)
CRYPTOSP DNA STL QL NAA+NON-PROBE: NOT DETECTED
D-LACTATE SERPL-SCNC: 3 MMOL/L (ref 0.5–2)
DEPRECATED RDW RBC AUTO: 47.6 FL (ref 37–54)
E HISTOLYT DNA STL QL NAA+NON-PROBE: NOT DETECTED
EAEC PAA PLAS AGGR+AATA ST NAA+NON-PRB: NOT DETECTED
EC STX1+STX2 GENES STL QL NAA+NON-PROBE: NOT DETECTED
EGFRCR SERPLBLD CKD-EPI 2021: 109.9 ML/MIN/1.73
EOSINOPHIL # BLD AUTO: 0.06 10*3/MM3 (ref 0–0.4)
EOSINOPHIL NFR BLD AUTO: 0.7 % (ref 0.3–6.2)
EPEC EAE GENE STL QL NAA+NON-PROBE: NOT DETECTED
ERYTHROCYTE [DISTWIDTH] IN BLOOD BY AUTOMATED COUNT: 14 % (ref 12.3–15.4)
ETEC LTA+ST1A+ST1B TOX ST NAA+NON-PROBE: NOT DETECTED
FENTANYL UR-MCNC: NEGATIVE NG/ML
G LAMBLIA DNA STL QL NAA+NON-PROBE: NOT DETECTED
GLOBULIN UR ELPH-MCNC: 3.1 GM/DL
GLUCOSE SERPL-MCNC: 131 MG/DL (ref 65–99)
GLUCOSE UR STRIP-MCNC: NEGATIVE MG/DL
HCG SERPL QL: NEGATIVE
HCT VFR BLD AUTO: 45 % (ref 34–46.6)
HGB BLD-MCNC: 15.2 G/DL (ref 12–15.9)
HGB UR QL STRIP.AUTO: NEGATIVE
HYALINE CASTS UR QL AUTO: ABNORMAL /LPF
IMM GRANULOCYTES # BLD AUTO: 0.02 10*3/MM3 (ref 0–0.05)
IMM GRANULOCYTES NFR BLD AUTO: 0.2 % (ref 0–0.5)
KETONES UR QL STRIP: NEGATIVE
LEUKOCYTE ESTERASE UR QL STRIP.AUTO: ABNORMAL
LIPASE SERPL-CCNC: 23 U/L (ref 13–60)
LYMPHOCYTES # BLD AUTO: 1.06 10*3/MM3 (ref 0.7–3.1)
LYMPHOCYTES NFR BLD AUTO: 11.7 % (ref 19.6–45.3)
MCH RBC QN AUTO: 31.1 PG (ref 26.6–33)
MCHC RBC AUTO-ENTMCNC: 33.8 G/DL (ref 31.5–35.7)
MCV RBC AUTO: 92 FL (ref 79–97)
METHADONE UR QL SCN: NEGATIVE
MONOCYTES # BLD AUTO: 0.8 10*3/MM3 (ref 0.1–0.9)
MONOCYTES NFR BLD AUTO: 8.8 % (ref 5–12)
NEUTROPHILS NFR BLD AUTO: 7.08 10*3/MM3 (ref 1.7–7)
NEUTROPHILS NFR BLD AUTO: 78 % (ref 42.7–76)
NITRITE UR QL STRIP: NEGATIVE
NOROVIRUS GI+II RNA STL QL NAA+NON-PROBE: NOT DETECTED
NRBC BLD AUTO-RTO: 0 /100 WBC (ref 0–0.2)
OPIATES UR QL: NEGATIVE
OXYCODONE UR QL SCN: NEGATIVE
P SHIGELLOIDES DNA STL QL NAA+NON-PROBE: NOT DETECTED
PCP UR QL SCN: NEGATIVE
PH UR STRIP.AUTO: 6 [PH] (ref 5–8)
PLATELET # BLD AUTO: 285 10*3/MM3 (ref 140–450)
PMV BLD AUTO: 11.3 FL (ref 6–12)
POTASSIUM SERPL-SCNC: 4.3 MMOL/L (ref 3.5–5.2)
PROT SERPL-MCNC: 7.5 G/DL (ref 6–8.5)
PROT UR QL STRIP: NEGATIVE
RBC # BLD AUTO: 4.89 10*6/MM3 (ref 3.77–5.28)
RBC # UR STRIP: ABNORMAL /HPF
REF LAB TEST METHOD: ABNORMAL
RVA RNA STL QL NAA+NON-PROBE: NOT DETECTED
S ENT+BONG DNA STL QL NAA+NON-PROBE: NOT DETECTED
SAPO I+II+IV+V RNA STL QL NAA+NON-PROBE: NOT DETECTED
SHIGELLA SP+EIEC IPAH ST NAA+NON-PROBE: NOT DETECTED
SODIUM SERPL-SCNC: 140 MMOL/L (ref 136–145)
SP GR UR STRIP: <=1.005 (ref 1–1.03)
SQUAMOUS #/AREA URNS HPF: ABNORMAL /HPF
TRICYCLICS UR QL SCN: NEGATIVE
UROBILINOGEN UR QL STRIP: ABNORMAL
V CHOL+PARA+VUL DNA STL QL NAA+NON-PROBE: NOT DETECTED
V CHOLERAE DNA STL QL NAA+NON-PROBE: NOT DETECTED
WBC # UR STRIP: ABNORMAL /HPF
WBC NRBC COR # BLD AUTO: 9.07 10*3/MM3 (ref 3.4–10.8)
Y ENTEROCOL DNA STL QL NAA+NON-PROBE: NOT DETECTED

## 2024-06-12 PROCEDURE — 25510000001 IOPAMIDOL 61 % SOLUTION: Performed by: NURSE PRACTITIONER

## 2024-06-12 PROCEDURE — 96374 THER/PROPH/DIAG INJ IV PUSH: CPT

## 2024-06-12 PROCEDURE — 80307 DRUG TEST PRSMV CHEM ANLYZR: CPT | Performed by: NURSE PRACTITIONER

## 2024-06-12 PROCEDURE — 96375 TX/PRO/DX INJ NEW DRUG ADDON: CPT

## 2024-06-12 PROCEDURE — 80053 COMPREHEN METABOLIC PANEL: CPT | Performed by: NURSE PRACTITIONER

## 2024-06-12 PROCEDURE — 84703 CHORIONIC GONADOTROPIN ASSAY: CPT | Performed by: NURSE PRACTITIONER

## 2024-06-12 PROCEDURE — 83690 ASSAY OF LIPASE: CPT | Performed by: NURSE PRACTITIONER

## 2024-06-12 PROCEDURE — 85025 COMPLETE CBC W/AUTO DIFF WBC: CPT | Performed by: NURSE PRACTITIONER

## 2024-06-12 PROCEDURE — 87507 IADNA-DNA/RNA PROBE TQ 12-25: CPT | Performed by: NURSE PRACTITIONER

## 2024-06-12 PROCEDURE — 83605 ASSAY OF LACTIC ACID: CPT | Performed by: NURSE PRACTITIONER

## 2024-06-12 PROCEDURE — 81001 URINALYSIS AUTO W/SCOPE: CPT | Performed by: NURSE PRACTITIONER

## 2024-06-12 PROCEDURE — 99285 EMERGENCY DEPT VISIT HI MDM: CPT

## 2024-06-12 PROCEDURE — 25010000002 ONDANSETRON PER 1 MG: Performed by: NURSE PRACTITIONER

## 2024-06-12 PROCEDURE — 74177 CT ABD & PELVIS W/CONTRAST: CPT

## 2024-06-12 PROCEDURE — 25810000003 LACTATED RINGERS SOLUTION: Performed by: NURSE PRACTITIONER

## 2024-06-12 RX ORDER — HYDROXYZINE 50 MG/1
1 TABLET, FILM COATED ORAL 3 TIMES DAILY PRN
COMMUNITY
Start: 2024-05-13

## 2024-06-12 RX ORDER — TRAZODONE HYDROCHLORIDE 50 MG/1
1 TABLET ORAL NIGHTLY PRN
COMMUNITY
Start: 2024-05-13

## 2024-06-12 RX ORDER — ARIPIPRAZOLE 20 MG/1
1 TABLET ORAL DAILY
COMMUNITY
Start: 2024-05-14

## 2024-06-12 RX ORDER — FAMOTIDINE 10 MG/ML
20 INJECTION, SOLUTION INTRAVENOUS ONCE
Status: COMPLETED | OUTPATIENT
Start: 2024-06-12 | End: 2024-06-12

## 2024-06-12 RX ORDER — ONDANSETRON 2 MG/ML
4 INJECTION INTRAMUSCULAR; INTRAVENOUS ONCE
Status: COMPLETED | OUTPATIENT
Start: 2024-06-12 | End: 2024-06-12

## 2024-06-12 RX ORDER — ONDANSETRON 4 MG/1
4 TABLET, ORALLY DISINTEGRATING ORAL EVERY 6 HOURS PRN
Qty: 10 TABLET | Refills: 0 | Status: SHIPPED | OUTPATIENT
Start: 2024-06-12

## 2024-06-12 RX ORDER — OXCARBAZEPINE 300 MG/1
1 TABLET, FILM COATED ORAL 2 TIMES DAILY
COMMUNITY
Start: 2024-05-13

## 2024-06-12 RX ORDER — FAMOTIDINE 20 MG/1
20 TABLET, FILM COATED ORAL 2 TIMES DAILY
Qty: 10 TABLET | Refills: 0 | Status: SHIPPED | OUTPATIENT
Start: 2024-06-12 | End: 2024-06-17

## 2024-06-12 RX ADMIN — ONDANSETRON 4 MG: 2 INJECTION INTRAMUSCULAR; INTRAVENOUS at 13:12

## 2024-06-12 RX ADMIN — SODIUM CHLORIDE, POTASSIUM CHLORIDE, SODIUM LACTATE AND CALCIUM CHLORIDE 1000 ML: 600; 310; 30; 20 INJECTION, SOLUTION INTRAVENOUS at 13:15

## 2024-06-12 RX ADMIN — IOPAMIDOL 100 ML: 612 INJECTION, SOLUTION INTRAVENOUS at 14:05

## 2024-06-12 RX ADMIN — FAMOTIDINE 20 MG: 10 INJECTION INTRAVENOUS at 13:14

## 2024-07-03 ENCOUNTER — LAB (OUTPATIENT)
Dept: LAB | Facility: HOSPITAL | Age: 39
End: 2024-07-03
Payer: COMMERCIAL

## 2024-07-03 ENCOUNTER — OFFICE VISIT (OUTPATIENT)
Dept: INTERNAL MEDICINE | Facility: CLINIC | Age: 39
End: 2024-07-03
Payer: COMMERCIAL

## 2024-07-03 VITALS
BODY MASS INDEX: 30.83 KG/M2 | RESPIRATION RATE: 16 BRPM | SYSTOLIC BLOOD PRESSURE: 119 MMHG | OXYGEN SATURATION: 97 % | WEIGHT: 174 LBS | HEIGHT: 63 IN | TEMPERATURE: 98.2 F | DIASTOLIC BLOOD PRESSURE: 88 MMHG | HEART RATE: 90 BPM

## 2024-07-03 DIAGNOSIS — E74.39 GLUCOSE INTOLERANCE: ICD-10-CM

## 2024-07-03 DIAGNOSIS — R53.83 OTHER FATIGUE: ICD-10-CM

## 2024-07-03 DIAGNOSIS — H65.00 ACUTE SEROUS OTITIS MEDIA, RECURRENCE NOT SPECIFIED, UNSPECIFIED LATERALITY: ICD-10-CM

## 2024-07-03 DIAGNOSIS — Z13.220 SCREENING, LIPID: ICD-10-CM

## 2024-07-03 DIAGNOSIS — N92.6 ABNORMAL MENSES: ICD-10-CM

## 2024-07-03 DIAGNOSIS — M89.9 BONE DISORDER: ICD-10-CM

## 2024-07-03 DIAGNOSIS — E66.09 CLASS 2 OBESITY DUE TO EXCESS CALORIES WITHOUT SERIOUS COMORBIDITY WITH BODY MASS INDEX (BMI) OF 36.0 TO 36.9 IN ADULT: ICD-10-CM

## 2024-07-03 DIAGNOSIS — Z00.01 ENCOUNTER FOR PREVENTATIVE ADULT HEALTH CARE EXAM WITH ABNORMAL FINDINGS: Primary | ICD-10-CM

## 2024-07-03 DIAGNOSIS — G89.29 CHRONIC RIGHT-SIDED LOW BACK PAIN WITH RIGHT-SIDED SCIATICA: ICD-10-CM

## 2024-07-03 DIAGNOSIS — M54.41 CHRONIC RIGHT-SIDED LOW BACK PAIN WITH RIGHT-SIDED SCIATICA: ICD-10-CM

## 2024-07-03 LAB
ALBUMIN SERPL-MCNC: 4.3 G/DL (ref 3.5–5.2)
ALBUMIN/GLOB SERPL: 1.3 G/DL
ALP SERPL-CCNC: 57 U/L (ref 39–117)
ALT SERPL W P-5'-P-CCNC: 28 U/L (ref 1–33)
ANION GAP SERPL CALCULATED.3IONS-SCNC: 11 MMOL/L (ref 5–15)
AST SERPL-CCNC: 18 U/L (ref 1–32)
BILIRUB SERPL-MCNC: 0.4 MG/DL (ref 0–1.2)
BUN SERPL-MCNC: 9 MG/DL (ref 6–20)
BUN/CREAT SERPL: 13.2 (ref 7–25)
CALCIUM SPEC-SCNC: 9.5 MG/DL (ref 8.6–10.5)
CHLORIDE SERPL-SCNC: 103 MMOL/L (ref 98–107)
CHOLEST SERPL-MCNC: 173 MG/DL (ref 0–200)
CO2 SERPL-SCNC: 27 MMOL/L (ref 22–29)
CREAT SERPL-MCNC: 0.68 MG/DL (ref 0.57–1)
DEPRECATED RDW RBC AUTO: 49.2 FL (ref 37–54)
EGFRCR SERPLBLD CKD-EPI 2021: 114.5 ML/MIN/1.73
ERYTHROCYTE [DISTWIDTH] IN BLOOD BY AUTOMATED COUNT: 14.5 % (ref 12.3–15.4)
GLOBULIN UR ELPH-MCNC: 3.3 GM/DL
GLUCOSE SERPL-MCNC: 101 MG/DL (ref 65–99)
HBA1C MFR BLD: 5.7 % (ref 4.8–5.6)
HCT VFR BLD AUTO: 44.2 % (ref 34–46.6)
HDLC SERPL-MCNC: 38 MG/DL (ref 40–60)
HGB BLD-MCNC: 14.6 G/DL (ref 12–15.9)
LDLC SERPL CALC-MCNC: 115 MG/DL (ref 0–100)
LDLC/HDLC SERPL: 2.98 {RATIO}
MCH RBC QN AUTO: 30.7 PG (ref 26.6–33)
MCHC RBC AUTO-ENTMCNC: 33 G/DL (ref 31.5–35.7)
MCV RBC AUTO: 93.1 FL (ref 79–97)
PLATELET # BLD AUTO: 400 10*3/MM3 (ref 140–450)
PMV BLD AUTO: 11.7 FL (ref 6–12)
POTASSIUM SERPL-SCNC: 4.5 MMOL/L (ref 3.5–5.2)
PROT SERPL-MCNC: 7.6 G/DL (ref 6–8.5)
RBC # BLD AUTO: 4.75 10*6/MM3 (ref 3.77–5.28)
SODIUM SERPL-SCNC: 141 MMOL/L (ref 136–145)
TRIGL SERPL-MCNC: 108 MG/DL (ref 0–150)
TSH SERPL DL<=0.05 MIU/L-ACNC: 1.74 UIU/ML (ref 0.27–4.2)
VLDLC SERPL-MCNC: 20 MG/DL (ref 5–40)
WBC NRBC COR # BLD AUTO: 7.41 10*3/MM3 (ref 3.4–10.8)

## 2024-07-03 PROCEDURE — 1160F RVW MEDS BY RX/DR IN RCRD: CPT | Performed by: NURSE PRACTITIONER

## 2024-07-03 PROCEDURE — 80050 GENERAL HEALTH PANEL: CPT

## 2024-07-03 PROCEDURE — 83036 HEMOGLOBIN GLYCOSYLATED A1C: CPT

## 2024-07-03 PROCEDURE — 82306 VITAMIN D 25 HYDROXY: CPT

## 2024-07-03 PROCEDURE — 83002 ASSAY OF GONADOTROPIN (LH): CPT

## 2024-07-03 PROCEDURE — 80061 LIPID PANEL: CPT

## 2024-07-03 PROCEDURE — 82746 ASSAY OF FOLIC ACID SERUM: CPT

## 2024-07-03 PROCEDURE — 82607 VITAMIN B-12: CPT

## 2024-07-03 PROCEDURE — 83001 ASSAY OF GONADOTROPIN (FSH): CPT

## 2024-07-03 PROCEDURE — 36415 COLL VENOUS BLD VENIPUNCTURE: CPT

## 2024-07-03 PROCEDURE — 1159F MED LIST DOCD IN RCRD: CPT | Performed by: NURSE PRACTITIONER

## 2024-07-03 PROCEDURE — 2014F MENTAL STATUS ASSESS: CPT | Performed by: NURSE PRACTITIONER

## 2024-07-03 PROCEDURE — 99395 PREV VISIT EST AGE 18-39: CPT | Performed by: NURSE PRACTITIONER

## 2024-07-03 PROCEDURE — 84403 ASSAY OF TOTAL TESTOSTERONE: CPT

## 2024-07-03 RX ORDER — OXCARBAZEPINE 300 MG/1
300 TABLET, FILM COATED ORAL 2 TIMES DAILY
Qty: 60 TABLET | Refills: 1 | OUTPATIENT
Start: 2024-07-03

## 2024-07-03 RX ORDER — FLUCONAZOLE 150 MG/1
150 TABLET ORAL
Qty: 2 TABLET | Refills: 0 | Status: SHIPPED | OUTPATIENT
Start: 2024-07-03

## 2024-07-03 RX ORDER — ARIPIPRAZOLE 400 MG
400 KIT INTRAMUSCULAR
COMMUNITY
Start: 2024-06-18

## 2024-07-03 RX ORDER — HYDROXYZINE PAMOATE 25 MG/1
25 CAPSULE ORAL 3 TIMES DAILY PRN
COMMUNITY
Start: 2024-07-03

## 2024-07-03 RX ORDER — FLUTICASONE PROPIONATE 50 MCG
2 SPRAY, SUSPENSION (ML) NASAL DAILY
Qty: 15.8 ML | Refills: 0 | Status: SHIPPED | OUTPATIENT
Start: 2024-07-03

## 2024-07-03 RX ORDER — AMOXICILLIN AND CLAVULANATE POTASSIUM 875; 125 MG/1; MG/1
1 TABLET, FILM COATED ORAL 2 TIMES DAILY
Qty: 20 TABLET | Refills: 0 | Status: SHIPPED | OUTPATIENT
Start: 2024-07-03 | End: 2024-07-13

## 2024-07-03 RX ORDER — CETIRIZINE HYDROCHLORIDE 10 MG/1
10 TABLET ORAL DAILY
Qty: 30 TABLET | Refills: 2 | Status: SHIPPED | OUTPATIENT
Start: 2024-07-03

## 2024-07-04 LAB
25(OH)D3 SERPL-MCNC: 33.3 NG/ML (ref 30–100)
FOLATE SERPL-MCNC: 9.61 NG/ML (ref 4.78–24.2)
FSH SERPL-ACNC: 2.07 MIU/ML
LH SERPL-ACNC: 2.37 MIU/ML
TESTOST SERPL-MCNC: 61.3 NG/DL (ref 8.4–48.1)
VIT B12 BLD-MCNC: 494 PG/ML (ref 211–946)

## 2024-07-05 RX ORDER — ARIPIPRAZOLE 20 MG/1
20 TABLET ORAL DAILY
Qty: 30 TABLET | Refills: 1 | OUTPATIENT
Start: 2024-07-05

## 2024-07-05 RX ORDER — HYDROXYZINE HYDROCHLORIDE 50 MG/1
50 TABLET, FILM COATED ORAL 3 TIMES DAILY PRN
Qty: 90 TABLET | Refills: 1 | OUTPATIENT
Start: 2024-07-05

## 2024-07-08 ENCOUNTER — TREATMENT (OUTPATIENT)
Dept: PHYSICAL THERAPY | Facility: CLINIC | Age: 39
End: 2024-07-08
Payer: COMMERCIAL

## 2024-07-08 DIAGNOSIS — G89.29 CHRONIC RIGHT-SIDED LOW BACK PAIN WITH RIGHT-SIDED SCIATICA: Primary | ICD-10-CM

## 2024-07-08 DIAGNOSIS — M54.41 CHRONIC RIGHT-SIDED LOW BACK PAIN WITH RIGHT-SIDED SCIATICA: Primary | ICD-10-CM

## 2024-07-09 ENCOUNTER — PATIENT MESSAGE (OUTPATIENT)
Dept: INTERNAL MEDICINE | Facility: CLINIC | Age: 39
End: 2024-07-09
Payer: COMMERCIAL

## 2024-07-09 ENCOUNTER — LAB (OUTPATIENT)
Dept: LAB | Facility: HOSPITAL | Age: 39
End: 2024-07-09
Payer: COMMERCIAL

## 2024-07-09 ENCOUNTER — TELEPHONE (OUTPATIENT)
Dept: INTERNAL MEDICINE | Facility: CLINIC | Age: 39
End: 2024-07-09
Payer: COMMERCIAL

## 2024-07-09 DIAGNOSIS — N92.6 ABNORMAL MENSES: ICD-10-CM

## 2024-07-09 DIAGNOSIS — R53.83 OTHER FATIGUE: ICD-10-CM

## 2024-07-09 LAB
ESTRADIOL SERPL HS-MCNC: 60.3 PG/ML
PROLACTIN SERPL-MCNC: 14.7 NG/ML (ref 4.79–23.3)

## 2024-07-09 PROCEDURE — 84146 ASSAY OF PROLACTIN: CPT

## 2024-07-09 PROCEDURE — 83525 ASSAY OF INSULIN: CPT

## 2024-07-09 PROCEDURE — 82670 ASSAY OF TOTAL ESTRADIOL: CPT

## 2024-07-09 PROCEDURE — 36415 COLL VENOUS BLD VENIPUNCTURE: CPT

## 2024-07-09 PROCEDURE — 82627 DEHYDROEPIANDROSTERONE: CPT

## 2024-07-10 ENCOUNTER — LAB (OUTPATIENT)
Dept: LAB | Facility: HOSPITAL | Age: 39
End: 2024-07-10
Payer: COMMERCIAL

## 2024-07-10 ENCOUNTER — TELEPHONE (OUTPATIENT)
Dept: INTERNAL MEDICINE | Facility: CLINIC | Age: 39
End: 2024-07-10
Payer: COMMERCIAL

## 2024-07-10 DIAGNOSIS — R53.83 OTHER FATIGUE: ICD-10-CM

## 2024-07-10 DIAGNOSIS — N92.6 ABNORMAL MENSES: ICD-10-CM

## 2024-07-10 LAB
CORTIS SERPL-MCNC: 16.9 MCG/DL
DHEA-S SERPL-MCNC: 527 UG/DL (ref 57.3–279.2)
INSULIN SERPL-ACNC: 6 UIU/ML (ref 2.6–24.9)

## 2024-07-10 PROCEDURE — 82533 TOTAL CORTISOL: CPT

## 2024-07-16 ENCOUNTER — TREATMENT (OUTPATIENT)
Dept: PHYSICAL THERAPY | Facility: CLINIC | Age: 39
End: 2024-07-16
Payer: COMMERCIAL

## 2024-07-16 ENCOUNTER — LAB (OUTPATIENT)
Dept: LAB | Facility: HOSPITAL | Age: 39
End: 2024-07-16
Payer: COMMERCIAL

## 2024-07-16 DIAGNOSIS — M54.41 CHRONIC RIGHT-SIDED LOW BACK PAIN WITH RIGHT-SIDED SCIATICA: Primary | ICD-10-CM

## 2024-07-16 DIAGNOSIS — E28.1 ELEVATED ANDROGEN LEVELS: ICD-10-CM

## 2024-07-16 DIAGNOSIS — G89.29 CHRONIC RIGHT-SIDED LOW BACK PAIN WITH RIGHT-SIDED SCIATICA: Primary | ICD-10-CM

## 2024-07-16 PROCEDURE — 80299 QUANTITATIVE ASSAY DRUG: CPT

## 2024-07-16 PROCEDURE — 83835 ASSAY OF METANEPHRINES: CPT

## 2024-07-16 PROCEDURE — 82088 ASSAY OF ALDOSTERONE: CPT

## 2024-07-16 PROCEDURE — 84244 ASSAY OF RENIN: CPT

## 2024-07-16 PROCEDURE — 36415 COLL VENOUS BLD VENIPUNCTURE: CPT

## 2024-07-19 LAB
METANEPH FREE SERPL-MCNC: <25 PG/ML (ref 0–88)
NORMETANEPHRINE SERPL-MCNC: 71.2 PG/ML (ref 0–210.1)

## 2024-07-22 LAB
ALDOST SERPL-MCNC: 10.6 NG/DL (ref 0–30)
ALDOST/RENIN PLAS-RTO: 22.5 {RATIO} (ref 0–30)
RENIN PLAS-CCNC: 0.47 NG/ML/HR (ref 0.17–5.38)

## 2024-07-25 ENCOUNTER — TELEPHONE (OUTPATIENT)
Dept: INTERNAL MEDICINE | Facility: CLINIC | Age: 39
End: 2024-07-25
Payer: COMMERCIAL

## 2024-07-25 ENCOUNTER — PATIENT MESSAGE (OUTPATIENT)
Dept: INTERNAL MEDICINE | Facility: CLINIC | Age: 39
End: 2024-07-25
Payer: COMMERCIAL

## 2024-07-25 LAB — DEXAMETHASONE SERPL-MCNC: 528 NG/DL

## 2024-08-05 ENCOUNTER — TREATMENT (OUTPATIENT)
Dept: PHYSICAL THERAPY | Facility: CLINIC | Age: 39
End: 2024-08-05
Payer: COMMERCIAL

## 2024-08-05 DIAGNOSIS — G89.29 CHRONIC RIGHT-SIDED LOW BACK PAIN WITH RIGHT-SIDED SCIATICA: Primary | ICD-10-CM

## 2024-08-05 DIAGNOSIS — M54.41 CHRONIC RIGHT-SIDED LOW BACK PAIN WITH RIGHT-SIDED SCIATICA: Primary | ICD-10-CM

## 2024-08-05 PROCEDURE — 97140 MANUAL THERAPY 1/> REGIONS: CPT

## 2024-08-05 PROCEDURE — 97110 THERAPEUTIC EXERCISES: CPT

## 2024-08-12 ENCOUNTER — TELEPHONE (OUTPATIENT)
Dept: PHYSICAL THERAPY | Facility: OTHER | Age: 39
End: 2024-08-12
Payer: COMMERCIAL

## 2024-08-15 ENCOUNTER — TELEPHONE (OUTPATIENT)
Dept: INTERNAL MEDICINE | Facility: CLINIC | Age: 39
End: 2024-08-15
Payer: COMMERCIAL

## 2024-08-15 DIAGNOSIS — E28.1 ELEVATED ANDROGEN LEVELS: Primary | ICD-10-CM

## 2024-08-16 ENCOUNTER — TELEPHONE (OUTPATIENT)
Dept: INTERNAL MEDICINE | Facility: CLINIC | Age: 39
End: 2024-08-16
Payer: COMMERCIAL

## 2024-08-19 ENCOUNTER — LAB (OUTPATIENT)
Dept: LAB | Facility: HOSPITAL | Age: 39
End: 2024-08-19
Payer: COMMERCIAL

## 2024-08-19 DIAGNOSIS — E28.1 ELEVATED ANDROGEN LEVELS: ICD-10-CM

## 2024-08-19 DIAGNOSIS — H65.00 ACUTE SEROUS OTITIS MEDIA, RECURRENCE NOT SPECIFIED, UNSPECIFIED LATERALITY: ICD-10-CM

## 2024-08-19 LAB
CORTIS SERPL-MCNC: 14 MCG/DL
TESTOST SERPL-MCNC: 46 NG/DL (ref 8.4–48.1)

## 2024-08-19 PROCEDURE — 84403 ASSAY OF TOTAL TESTOSTERONE: CPT

## 2024-08-19 PROCEDURE — 82533 TOTAL CORTISOL: CPT

## 2024-08-19 PROCEDURE — 82627 DEHYDROEPIANDROSTERONE: CPT

## 2024-08-19 PROCEDURE — 36415 COLL VENOUS BLD VENIPUNCTURE: CPT

## 2024-08-19 RX ORDER — FLUTICASONE PROPIONATE 50 MCG
2 SPRAY, SUSPENSION (ML) NASAL DAILY
Qty: 15.8 ML | Refills: 0 | Status: SHIPPED | OUTPATIENT
Start: 2024-08-19

## 2024-08-20 DIAGNOSIS — H65.00 ACUTE SEROUS OTITIS MEDIA, RECURRENCE NOT SPECIFIED, UNSPECIFIED LATERALITY: ICD-10-CM

## 2024-08-20 LAB — DHEA-S SERPL-MCNC: 444 UG/DL (ref 57.3–279.2)

## 2024-08-20 RX ORDER — NICOTINE 21 MG/24HR
1 PATCH, TRANSDERMAL 24 HOURS TRANSDERMAL EVERY 24 HOURS
Qty: 28 EACH | Refills: 1 | Status: SHIPPED | OUTPATIENT
Start: 2024-08-20

## 2024-08-20 RX ORDER — FLUTICASONE PROPIONATE 50 MCG
2 SPRAY, SUSPENSION (ML) NASAL DAILY
Qty: 15.8 ML | Refills: 0 | OUTPATIENT
Start: 2024-08-20

## 2024-08-22 ENCOUNTER — TELEPHONE (OUTPATIENT)
Dept: INTERNAL MEDICINE | Facility: CLINIC | Age: 39
End: 2024-08-22
Payer: COMMERCIAL

## 2024-08-26 DIAGNOSIS — H65.00 ACUTE SEROUS OTITIS MEDIA, RECURRENCE NOT SPECIFIED, UNSPECIFIED LATERALITY: ICD-10-CM

## 2024-08-26 RX ORDER — CETIRIZINE HYDROCHLORIDE 10 MG/1
10 TABLET ORAL DAILY
Qty: 30 TABLET | Refills: 2 | OUTPATIENT
Start: 2024-08-26

## 2024-09-06 ENCOUNTER — HOSPITAL ENCOUNTER (OUTPATIENT)
Dept: CT IMAGING | Facility: HOSPITAL | Age: 39
Discharge: HOME | End: 2024-09-06
Payer: COMMERCIAL

## 2024-09-06 DIAGNOSIS — E28.1 ELEVATED ANDROGEN LEVELS: ICD-10-CM

## 2024-09-06 PROCEDURE — 25510000001 IOPAMIDOL PER 1 ML: Performed by: NURSE PRACTITIONER

## 2024-09-06 PROCEDURE — 74170 CT ABD WO CNTRST FLWD CNTRST: CPT

## 2024-09-06 RX ORDER — IOPAMIDOL 755 MG/ML
100 INJECTION, SOLUTION INTRAVASCULAR
Status: COMPLETED | OUTPATIENT
Start: 2024-09-06 | End: 2024-09-06

## 2024-09-06 RX ADMIN — IOPAMIDOL 100 ML: 755 INJECTION, SOLUTION INTRAVENOUS at 08:19

## 2024-10-03 ENCOUNTER — OFFICE VISIT (OUTPATIENT)
Dept: INTERNAL MEDICINE | Facility: CLINIC | Age: 39
End: 2024-10-03
Payer: COMMERCIAL

## 2024-10-03 VITALS
DIASTOLIC BLOOD PRESSURE: 74 MMHG | BODY MASS INDEX: 31.5 KG/M2 | OXYGEN SATURATION: 98 % | HEIGHT: 63 IN | RESPIRATION RATE: 16 BRPM | SYSTOLIC BLOOD PRESSURE: 93 MMHG | WEIGHT: 177.8 LBS | HEART RATE: 76 BPM

## 2024-10-03 DIAGNOSIS — F17.210 CIGARETTE SMOKER: Primary | ICD-10-CM

## 2024-10-03 DIAGNOSIS — F41.9 ANXIETY AND DEPRESSION: ICD-10-CM

## 2024-10-03 DIAGNOSIS — H65.00 ACUTE SEROUS OTITIS MEDIA, RECURRENCE NOT SPECIFIED, UNSPECIFIED LATERALITY: ICD-10-CM

## 2024-10-03 DIAGNOSIS — F32.A ANXIETY AND DEPRESSION: ICD-10-CM

## 2024-10-03 DIAGNOSIS — F25.0 SCHIZOAFFECTIVE DISORDER, BIPOLAR TYPE: ICD-10-CM

## 2024-10-03 PROBLEM — E66.811 CLASS 1 OBESITY DUE TO EXCESS CALORIES WITHOUT SERIOUS COMORBIDITY WITH BODY MASS INDEX (BMI) OF 31.0 TO 31.9 IN ADULT: Status: ACTIVE | Noted: 2020-06-22

## 2024-10-03 PROCEDURE — 99213 OFFICE O/P EST LOW 20 MIN: CPT | Performed by: INTERNAL MEDICINE

## 2024-10-03 PROCEDURE — 1160F RVW MEDS BY RX/DR IN RCRD: CPT | Performed by: INTERNAL MEDICINE

## 2024-10-03 PROCEDURE — 1159F MED LIST DOCD IN RCRD: CPT | Performed by: INTERNAL MEDICINE

## 2024-10-03 RX ORDER — NICOTINE 21 MG/24HR
1 PATCH, TRANSDERMAL 24 HOURS TRANSDERMAL EVERY 24 HOURS
Qty: 21 EACH | Refills: 0 | Status: SHIPPED | OUTPATIENT
Start: 2024-10-03

## 2024-10-03 RX ORDER — CETIRIZINE HYDROCHLORIDE 10 MG/1
10 TABLET ORAL DAILY
Qty: 30 TABLET | Refills: 5 | Status: SHIPPED | OUTPATIENT
Start: 2024-10-03

## 2024-10-03 RX ORDER — FLUTICASONE PROPIONATE 50 UG/1
2 SPRAY, METERED NASAL DAILY
Qty: 16 ML | Refills: 5 | Status: SHIPPED | OUTPATIENT
Start: 2024-10-03

## 2024-12-23 ENCOUNTER — OFFICE VISIT (OUTPATIENT)
Dept: INTERNAL MEDICINE | Facility: CLINIC | Age: 39
End: 2024-12-23
Payer: COMMERCIAL

## 2024-12-23 VITALS
OXYGEN SATURATION: 98 % | BODY MASS INDEX: 33.73 KG/M2 | DIASTOLIC BLOOD PRESSURE: 77 MMHG | SYSTOLIC BLOOD PRESSURE: 110 MMHG | HEIGHT: 63 IN | RESPIRATION RATE: 16 BRPM | WEIGHT: 190.4 LBS | HEART RATE: 74 BPM

## 2024-12-23 DIAGNOSIS — A08.4 VIRAL GASTROENTERITIS: ICD-10-CM

## 2024-12-23 DIAGNOSIS — R19.7 DIARRHEA, UNSPECIFIED TYPE: Primary | ICD-10-CM

## 2024-12-23 LAB
EXPIRATION DATE: NORMAL
FLUAV AG UPPER RESP QL IA.RAPID: NOT DETECTED
FLUBV AG UPPER RESP QL IA.RAPID: NOT DETECTED
INTERNAL CONTROL: NORMAL
Lab: NORMAL
SARS-COV-2 AG UPPER RESP QL IA.RAPID: NOT DETECTED

## 2024-12-23 PROCEDURE — 87428 SARSCOV & INF VIR A&B AG IA: CPT | Performed by: INTERNAL MEDICINE

## 2024-12-23 PROCEDURE — 99213 OFFICE O/P EST LOW 20 MIN: CPT | Performed by: INTERNAL MEDICINE

## 2024-12-23 PROCEDURE — 1160F RVW MEDS BY RX/DR IN RCRD: CPT | Performed by: INTERNAL MEDICINE

## 2024-12-23 PROCEDURE — 1159F MED LIST DOCD IN RCRD: CPT | Performed by: INTERNAL MEDICINE

## 2024-12-23 RX ORDER — ONDANSETRON 4 MG/1
4 TABLET, ORALLY DISINTEGRATING ORAL EVERY 8 HOURS PRN
Qty: 20 TABLET | Refills: 0 | Status: SHIPPED | OUTPATIENT
Start: 2024-12-23

## 2024-12-23 RX ORDER — ARIPIPRAZOLE 960 MG/3.2ML
INJECTION, SUSPENSION, EXTENDED RELEASE INTRAMUSCULAR
COMMUNITY
Start: 2024-12-04

## 2024-12-23 NOTE — PROGRESS NOTES
CC: nausea    History:  Kaitlyn Yanes is a 39 y.o. female   History of Present Illness  The patient presents for evaluation of nausea and diarrhea.    She reports experiencing nausea, diarrhea, and a loss of appetite, which have been persisting since the onset of the day. She has not attempted to consume any food but has been able to tolerate water. She does not report any presence of blood in her stool or any difficulty or pain during swallowing. She has been able to manage her bathroom needs despite the diarrhea. She has not taken any medication for her symptoms.        ROS:  Review of Systems   Respiratory:  Negative for cough and shortness of breath.    Gastrointestinal:  Positive for diarrhea and nausea. Negative for abdominal pain, blood in stool and constipation.        reports that she has been smoking cigarettes. She started smoking about 19 years ago. She has a 10.2 pack-year smoking history. She has been exposed to tobacco smoke. She has never used smokeless tobacco. She reports current alcohol use. She reports current drug use. Drug: Marijuana.      Current Outpatient Medications:     Abilify Asimtufii 960 MG/3.2ML prefilled syringe, Every 3 months, Disp: , Rfl:     cetirizine (zyrTEC) 10 MG tablet, Take 1 tablet by mouth Daily., Disp: 30 tablet, Rfl: 5    fluticasone (FLONASE) 50 MCG/ACT nasal spray, Administer 2 sprays into the nostril(s) as directed by provider Daily., Disp: 16 mL, Rfl: 5    nicotine (NICODERM CQ) 7 MG/24HR patch, Place 1 patch on the skin as directed by provider Daily., Disp: 14 each, Rfl: 1    nicotine polacrilex (NICORETTE) 2 MG gum, Chew 1 each As Needed for Smoking Cessation. (Patient not taking: Reported on 12/23/2024), Disp: 150 each, Rfl: 1    ondansetron ODT (ZOFRAN-ODT) 4 MG disintegrating tablet, Place 1 tablet on the tongue Every 8 (Eight) Hours As Needed for Nausea or Vomiting., Disp: 20 tablet, Rfl: 0    OBJECTIVE:  /77 (BP Location: Left arm, Patient  "Position: Sitting, Cuff Size: Adult)   Pulse 74   Resp 16   Ht 160 cm (63\")   Wt 86.4 kg (190 lb 6.4 oz)   LMP  (LMP Unknown)   SpO2 98%   BMI 33.73 kg/m²    Physical Exam  Constitutional:       General: She is not in acute distress.  Pulmonary:      Effort: Pulmonary effort is normal. No respiratory distress.   Neurological:      Mental Status: She is alert and oriented to person, place, and time.         Assessment/Plan     Diagnoses and all orders for this visit:    1. Diarrhea, unspecified type (Primary)  -     POCT SARS-CoV-2 Antigen KEN + Flu  -     ondansetron ODT (ZOFRAN-ODT) 4 MG disintegrating tablet; Place 1 tablet on the tongue Every 8 (Eight) Hours As Needed for Nausea or Vomiting.  Dispense: 20 tablet; Refill: 0    2. Viral gastroenteritis      Assessment & Plan  1. Nausea and diarrhea.  Her COVID-19 and influenza tests have returned negative results, which is reassuring. The symptoms are likely due to a viral gastroenteritis. A prescription for Zofran will be sent to manage her nausea. She is advised to maintain hydration by consuming ample fluids and adhere to a BRAT diet consisting of bananas, rice, applesauce, and toast. Over-the-counter Imodium can be used to alleviate diarrhea. Adequate rest is recommended for the next few days. A work excuse note will be provided for today. If there is no improvement in her condition or if she develops a fever, a retest for COVID-19 may be necessary in the coming days.    Follow-up  The patient is scheduled for a follow-up visit in October 2025, or earlier if necessary.  Patient or patient representative verbalized consent for the use of Ambient Listening during the visit with  Noble Randall DO for chart documentation. 12/23/2024  14:51 CST    An After Visit Summary was printed and given to the patient at discharge.  Return for Next scheduled follow up.      Noble Randall D.O. 12/23/2024   Electronically signed.  "

## (undated) DEVICE — SUT VIC RAPD SZ 3/0 27IN PS1 VR935

## (undated) DEVICE — ST TBG PNEUMOCLEAR EVAC SMOKE HIFLO

## (undated) DEVICE — PK TURNOVER RM ADV

## (undated) DEVICE — 3M™ STERI-STRIP™ REINFORCED ADHESIVE SKIN CLOSURES, R1547, 1/2 IN X 4 IN (12 MM X 100 MM), 6 STRIPS/ENVELOPE: Brand: 3M™ STERI-STRIP™

## (undated) DEVICE — ENDOPATH XCEL WITH OPTIVIEW TECHNOLOGY UNIVERSAL TROCAR STABILITY SLEEVES: Brand: ENDOPATH XCEL OPTIVIEW

## (undated) DEVICE — GLV SURG TRIUMPH MICRO PF LTX 8 STRL

## (undated) DEVICE — ADHS LIQ MASTISOL 2/3ML

## (undated) DEVICE — SYR LL TP 10ML STRL

## (undated) DEVICE — DRSNG SURESITE WNDW 2.38X2.75

## (undated) DEVICE — HARMONIC ACE +7 LAPAROSCOPIC SHEARS ADVANCED HEMOSTASIS 5MM DIAMETER 36CM SHAFT LENGTH  FOR USE WITH GRAY HAND PIECE ONLY: Brand: HARMONIC ACE

## (undated) DEVICE — PK LAP GYN 30

## (undated) DEVICE — NDL HYPO PRECISIONGLIDE REG 22G 1 1/2

## (undated) DEVICE — GLV SURG BIOGEL M LTX PF 8

## (undated) DEVICE — ENDOPATH XCEL WITH OPTIVIEW TECHNOLOGY BLADELESS TROCARS WITH STABILITY SLEEVES: Brand: ENDOPATH XCEL OPTIVIEW

## (undated) DEVICE — SYR CONTRL LUERLOK 10CC

## (undated) DEVICE — SPNG GZ 2S 2X2 8PLY STRL PK/2